# Patient Record
Sex: FEMALE | Race: BLACK OR AFRICAN AMERICAN | NOT HISPANIC OR LATINO | Employment: FULL TIME | URBAN - METROPOLITAN AREA
[De-identification: names, ages, dates, MRNs, and addresses within clinical notes are randomized per-mention and may not be internally consistent; named-entity substitution may affect disease eponyms.]

---

## 2018-08-14 ENCOUNTER — OFFICE VISIT (OUTPATIENT)
Dept: UROGYNECOLOGY | Facility: CLINIC | Age: 23
End: 2018-08-14
Payer: COMMERCIAL

## 2018-08-14 VITALS
WEIGHT: 127 LBS | BODY MASS INDEX: 21.68 KG/M2 | SYSTOLIC BLOOD PRESSURE: 110 MMHG | DIASTOLIC BLOOD PRESSURE: 70 MMHG | HEIGHT: 64 IN

## 2018-08-14 DIAGNOSIS — Z12.4 ENCOUNTER FOR SCREENING FOR CERVICAL CANCER: ICD-10-CM

## 2018-08-14 DIAGNOSIS — Z01.419 WELL WOMAN EXAM: Primary | ICD-10-CM

## 2018-08-14 DIAGNOSIS — N89.8 VAGINAL DISCHARGE: ICD-10-CM

## 2018-08-14 PROCEDURE — 87491 CHLMYD TRACH DNA AMP PROBE: CPT

## 2018-08-14 PROCEDURE — 99999 PR PBB SHADOW E&M-NEW PATIENT-LVL III: CPT | Mod: PBBFAC,,, | Performed by: NURSE PRACTITIONER

## 2018-08-14 PROCEDURE — 99385 PREV VISIT NEW AGE 18-39: CPT | Mod: S$GLB,,, | Performed by: NURSE PRACTITIONER

## 2018-08-14 PROCEDURE — 87660 TRICHOMONAS VAGIN DIR PROBE: CPT

## 2018-08-14 PROCEDURE — 88175 CYTOPATH C/V AUTO FLUID REDO: CPT

## 2018-08-14 NOTE — PATIENT INSTRUCTIONS
1. Well woman  --pap today  --takes 2-3 weeks for results    2. Contraception  --desires mirena  --will send benefits approval  --plan to insert when on menses  --if you have another menses prior to approval, will plan on ortho evra x one month    3. Vaginal discharge  --affirm/ genprobe today    4. RTC for iud insertion

## 2018-08-14 NOTE — PROGRESS NOTES
"08/14/2018    SUBJECTIVE:   23 y.o. female for annual exam.  Was on depoprovera since age 20-- last depo was due in 04/2018-- not using contraception.    History reviewed. No pertinent past medical history.    History reviewed. No pertinent surgical history.    No current outpatient medications on file.     No current facility-administered medications for this visit.      Allergies: Patient has no known allergies.   Patient's last menstrual period was 07/29/2018.     Menses q 28- 30 days-- has had 3 cycles since stopping depoprovera       Well Woman:  Pap:n/a        Family History  Family History   Problem Relation Age of Onset    Vaginal cancer Neg Hx     Endometrial cancer Neg Hx     Cervical cancer Neg Hx           ROS:  Feeling well.   No dyspnea or chest pain on exertion.    No abdominal pain, change in bowel habits, black or bloody stools.    No urinary tract symptoms.   GYN ROS: normal menses, no abnormal bleeding, pelvic pain or discharge, no breast pain or new or enlarging lumps on self exam.   No neurological complaints.    OBJECTIVE:   The patient appears well, alert, oriented x 3, in no distress.  /70 (BP Location: Right arm, Patient Position: Sitting, BP Method: Medium (Manual))   Ht 5' 4" (1.626 m)   Wt 57.6 kg (126 lb 15.8 oz)   LMP 07/29/2018   BMI 21.80 kg/m²   ENT normal.  Neck supple. No adenopathy or thyromegaly. NICOLE.   Lungs are clear, good air entry, no wheezes, rhonchi or rales.   S1 and S2 normal, no murmurs, regular rate and rhythm.   Abdomen soft without tenderness, guarding, mass or organomegaly.   Extremities show no edema, normal peripheral pulses.   Neurological is normal, no focal findings.    BREAST EXAM: breasts appear normal, no suspicious masses, no skin or nipple changes or axillary nodes    PELVIC EXAM:   VULVA: normal appearing vulva with no masses, tenderness or lesions,   VAGINA: normal appearing vagina with normal color and discharge, no lesions,  CERVIX: " normal appearing cervix without discharge or lesions,   UTERUS: uterus is normal size, shape, consistency and nontender,   ADNEXA: no masses,     ASSESSMENT:   1. Well woman exam     2. Vaginal discharge  Vaginosis Screen by DNA Probe    C. trachomatis/N. gonorrhoeae by AMP DNA [facility]; Cervicovaginal   3. Encounter for screening for cervical cancer   Liquid-based pap smear, screening       PLAN:   1. Well woman  --pap today  --takes 2-3 weeks for results    2. Contraception  --desires mirena  --will send benefits approval  --plan to insert when on menses  --if you have another menses prior to approval, will plan on ortho evra x one month    3. Vaginal discharge  --affirm/ genprobe today    4. RTC for iud insertion        30 minutes were spent in face to face time with this patient  90 % of this time was spent in counseling and/or coordination of care  Rosa NASH [facility]  Division of Female Pelvic Medicine and Reconstructive Surgery  Department of Obstetrics & Gynecology

## 2018-08-15 LAB
C TRACH DNA SPEC QL NAA+PROBE: NOT DETECTED
CANDIDA RRNA VAG QL PROBE: NEGATIVE
G VAGINALIS RRNA GENITAL QL PROBE: NEGATIVE
N GONORRHOEA DNA SPEC QL NAA+PROBE: NOT DETECTED
T VAGINALIS RRNA GENITAL QL PROBE: NEGATIVE

## 2018-08-16 ENCOUNTER — TELEPHONE (OUTPATIENT)
Dept: UROGYNECOLOGY | Facility: CLINIC | Age: 23
End: 2018-08-16

## 2018-08-16 NOTE — TELEPHONE ENCOUNTER
----- Message from Echo Bae sent at 8/16/2018  4:47 PM CDT -----  Contact: self  Patient returned call; informed patient negative GC/CT results and Negative affirm results as per Rosa Benoit    Informed patient that her PAP was still in process, patient had no other questions

## 2018-08-16 NOTE — TELEPHONE ENCOUNTER
----- Message from Rosa Benoit NP sent at 8/16/2018  9:55 AM CDT -----  Please let patient know vaginal cultures were negative.  STEPAN Rashid-BC

## 2018-08-16 NOTE — TELEPHONE ENCOUNTER
Called pt no answer, Left voice message for pt to give the office a call back at [phone] regarding test results.

## 2018-08-21 ENCOUNTER — TELEPHONE (OUTPATIENT)
Dept: UROGYNECOLOGY | Facility: CLINIC | Age: 23
End: 2018-08-21

## 2018-08-21 NOTE — TELEPHONE ENCOUNTER
Returned pt call no answer, Left voice message for pt to give the office a call back at [phone].

## 2018-08-21 NOTE — TELEPHONE ENCOUNTER
----- Message from Miriam Morales sent at 8/21/2018  1:03 PM CDT -----  Contact: RUPERT ORLANDO [[id number]]            Name of Who is Calling: RUPERT ORLANDO [[id number]]    What is the request in detail: patient states she was told by NP to call the day she start her cycle for IUD insertion. Please call     Can the clinic reply by [facility]:no      [facility]: [phone]

## 2018-08-22 ENCOUNTER — TELEPHONE (OUTPATIENT)
Dept: UROGYNECOLOGY | Facility: CLINIC | Age: 23
End: 2018-08-22

## 2018-08-22 NOTE — TELEPHONE ENCOUNTER
----- Message from Neto Reynolds sent at 8/22/2018  8:38 AM CDT -----  PLEASE CALL PT SHE WAS TOLD TO CALL BACK WHEN HER CYCLE IS DOWN [phone]

## 2018-08-23 ENCOUNTER — PROCEDURE VISIT (OUTPATIENT)
Dept: UROGYNECOLOGY | Facility: CLINIC | Age: 23
End: 2018-08-23
Payer: COMMERCIAL

## 2018-08-23 VITALS
HEIGHT: 64 IN | SYSTOLIC BLOOD PRESSURE: 90 MMHG | BODY MASS INDEX: 21.38 KG/M2 | DIASTOLIC BLOOD PRESSURE: 60 MMHG | WEIGHT: 125.25 LBS

## 2018-08-23 DIAGNOSIS — Z30.430 ENCOUNTER FOR IUD INSERTION: Primary | ICD-10-CM

## 2018-08-23 PROCEDURE — 58300 INSERT INTRAUTERINE DEVICE: CPT | Mod: S$GLB,,, | Performed by: NURSE PRACTITIONER

## 2018-08-23 NOTE — PROCEDURES
Procedures     PROCEDURE:  Placement of IUD (Mirena)    The patient was consented for the procedure.  All risks/benefits/alternatives were reviewed (see consent). The patient was placed in dorsal lithotomy position.  A sterile speculum was used to identify the cervix. The ectocervix was prepped x 2 with betadine.  A single toothed was used to grasp the posterior cervix.  A uterine sound was advanced into the uterine cavity until gentle resistance was met, at approximately 5.5 cm.  The IUD device was loaded back into the cannula, and the guard was set to 5.5 cm. The cannula was inserted into the cervix until the guard was flush with the external os.  The IUD was then deployed using standard technique, and the cannula was removed.  The single toothed was removed from the cervix, and excellent hemostasis was noted.  The strings were trimmed to about 3 cm.  The patient tolerated the procedure well. After cleaning the vault with ob swabs, the tip of the IUD was noted deep in the cervical os.  Patient was given expulsion precautions.  If IUD does not migrate appropriately, will consider kyleena.  Rosa Benoit, STEPAN-BC

## 2018-09-05 ENCOUNTER — OFFICE VISIT (OUTPATIENT)
Dept: UROGYNECOLOGY | Facility: CLINIC | Age: 23
End: 2018-09-05
Payer: COMMERCIAL

## 2018-09-05 VITALS
WEIGHT: 124.56 LBS | SYSTOLIC BLOOD PRESSURE: 100 MMHG | DIASTOLIC BLOOD PRESSURE: 60 MMHG | BODY MASS INDEX: 21.27 KG/M2 | HEIGHT: 64 IN

## 2018-09-05 DIAGNOSIS — T83.32XA INTRAUTERINE DEVICE (IUD) MIGRATION, INITIAL ENCOUNTER: Primary | ICD-10-CM

## 2018-09-05 PROCEDURE — 99213 OFFICE O/P EST LOW 20 MIN: CPT | Mod: S$GLB,,, | Performed by: NURSE PRACTITIONER

## 2018-09-05 PROCEDURE — 3008F BODY MASS INDEX DOCD: CPT | Mod: CPTII,S$GLB,, | Performed by: NURSE PRACTITIONER

## 2018-09-05 PROCEDURE — 99999 PR PBB SHADOW E&M-EST. PATIENT-LVL III: CPT | Mod: PBBFAC,,, | Performed by: NURSE PRACTITIONER

## 2018-09-05 NOTE — PROGRESS NOTES
"09/05/2018    SUBJECTIVE:   23 y.o. female for IUD check    History reviewed. No pertinent past medical history.    History reviewed. No pertinent surgical history.    Current Outpatient Medications   Medication Sig Dispense Refill    levonorgestrel (MIRENA) 20 mcg/24 hr (5 years) IUD 1 each by Intrauterine route once.       No current facility-administered medications for this visit.      Allergies: Patient has no known allergies.   Patient's last menstrual period was 08/20/2018.      Well Woman:  Pap:08/2018 normal        Family History  Family History   Problem Relation Age of Onset    Vaginal cancer Neg Hx     Endometrial cancer Neg Hx     Cervical cancer Neg Hx         ROS:  Feeling well.   No dyspnea or chest pain on exertion.    No abdominal pain, change in bowel habits, black or bloody stools.    No urinary tract symptoms.   GYN ROS: normal menses, no abnormal bleeding, pelvic pain or discharge, no breast pain or new or enlarging lumps on self exam. No neurological complaints.    OBJECTIVE:   The patient appears well, alert, oriented x 3, in no distress.  /60 (BP Location: Right arm, Patient Position: Sitting, BP Method: Medium (Manual))   Ht 5' 4" (1.626 m)   Wt 56.5 kg (124 lb 9 oz)   LMP 08/20/2018   BMI 21.38 kg/m²   Abdomen soft without tenderness, guarding, mass or organomegaly.   Extremities show no edema, normal peripheral pulses.   Neurological is normal, no focal findings.      PELVIC EXAM:   VULVA: normal appearing vulva with no masses, tenderness or lesions,   VAGINA: normal appearing vagina with normal color and discharge, no lesions,  CERVIX: normal appearing cervix without discharge or lesions, tip of iud visible at cervical os,   UTERUS: uterus is normal size, shape, consistency and nontender,   ADNEXA: no masses,     ASSESSMENT:   No diagnosis found.    PLAN:   1. Pelvic ultrasound to evaluate iud placement  --if malpositioned, consider kyleena      25 minutes were spent in " face to face time with this patient  90 % of this time was spent in counseling and/or coordination of care  Rosa NASH [facility]  Division of Female Pelvic Medicine and Reconstructive Surgery  Department of Obstetrics & Gynecology

## 2018-09-06 ENCOUNTER — HOSPITAL ENCOUNTER (OUTPATIENT)
Dept: RADIOLOGY | Facility: OTHER | Age: 23
Discharge: HOME | End: 2018-09-06
Attending: NURSE PRACTITIONER
Payer: COMMERCIAL

## 2018-09-06 DIAGNOSIS — T83.32XA INTRAUTERINE DEVICE (IUD) MIGRATION, INITIAL ENCOUNTER: ICD-10-CM

## 2018-09-06 PROCEDURE — 76830 TRANSVAGINAL US NON-OB: CPT | Mod: TC

## 2018-09-06 PROCEDURE — 76856 US EXAM PELVIC COMPLETE: CPT | Mod: 26,,, | Performed by: RADIOLOGY

## 2018-09-06 PROCEDURE — 76830 TRANSVAGINAL US NON-OB: CPT | Mod: 26,,, | Performed by: RADIOLOGY

## 2018-09-17 ENCOUNTER — TELEPHONE (OUTPATIENT)
Dept: UROGYNECOLOGY | Facility: CLINIC | Age: 23
End: 2018-09-17

## 2018-09-17 NOTE — TELEPHONE ENCOUNTER
Spoke with [employer] [phone] and completed a benefit investigation request form for mima Kyleena #[id number]. They will notifiy the office once a decision is made by the end of the week.

## 2018-09-21 ENCOUNTER — TELEPHONE (OUTPATIENT)
Dept: UROGYNECOLOGY | Facility: CLINIC | Age: 23
End: 2018-09-21

## 2018-09-21 NOTE — TELEPHONE ENCOUNTER
Spoke with [employer] [phone] regarding pt's case #[id number], Letter will be faxed out today and we should receive it by next week.

## 2018-09-26 ENCOUNTER — TELEPHONE (OUTPATIENT)
Dept: UROGYNECOLOGY | Facility: CLINIC | Age: 23
End: 2018-09-26

## 2018-09-26 DIAGNOSIS — Z30.430 ENCOUNTER FOR IUD INSERTION: Primary | ICD-10-CM

## 2018-10-19 ENCOUNTER — TELEPHONE (OUTPATIENT)
Dept: UROGYNECOLOGY | Facility: CLINIC | Age: 23
End: 2018-10-19

## 2018-10-19 NOTE — TELEPHONE ENCOUNTER
----- Message from Neto Reynolds sent at 10/19/2018  4:12 PM CDT -----  Pt returning your call [phone]

## 2018-10-23 ENCOUNTER — TELEPHONE (OUTPATIENT)
Dept: UROGYNECOLOGY | Facility: CLINIC | Age: 23
End: 2018-10-23

## 2018-10-23 NOTE — TELEPHONE ENCOUNTER
Called pt no answer, Left voice message for pt to give the office a call back at [phone] to schedule an apointment for IUD INSERTION.

## 2018-10-23 NOTE — TELEPHONE ENCOUNTER
----- Message from Karen Child sent at 10/23/2018  2:26 PM CDT -----  Contact: pt   Name of Who is Calling: RUPERT ORLANDO [[id number]]       What is the request in detail: patient is returning a call...Please contact to further discuss and advise.       Can the clinic reply by [facility]: no       What Number to Call Back if not in [facility]: [phone]

## 2018-10-23 NOTE — TELEPHONE ENCOUNTER
Returned pt call no answer, Left voice message for pt to give the office a call back at [phone] to schedule an IUD insertion appointment during her menses.

## 2018-10-23 NOTE — TELEPHONE ENCOUNTER
----- Message from Rosa Benoit NP sent at 10/19/2018  2:21 PM CDT -----  Please call patient to schedule insertion/ removal of iud-- we will place a celio Lee

## 2018-10-23 NOTE — TELEPHONE ENCOUNTER
Called pt no answer, Left voice message for pt to give the office a call back at [phone] to schedule an apt for IUD insertion.

## 2018-10-31 ENCOUNTER — TELEPHONE (OUTPATIENT)
Dept: UROGYNECOLOGY | Facility: CLINIC | Age: 23
End: 2018-10-31

## 2018-10-31 ENCOUNTER — PROCEDURE VISIT (OUTPATIENT)
Dept: UROGYNECOLOGY | Facility: CLINIC | Age: 23
End: 2018-10-31
Payer: COMMERCIAL

## 2018-10-31 VITALS
WEIGHT: 128.5 LBS | DIASTOLIC BLOOD PRESSURE: 70 MMHG | HEIGHT: 64 IN | SYSTOLIC BLOOD PRESSURE: 120 MMHG | BODY MASS INDEX: 21.94 KG/M2

## 2018-10-31 DIAGNOSIS — Z30.430 ENCOUNTER FOR IUD INSERTION: Primary | ICD-10-CM

## 2018-10-31 PROCEDURE — 58300 INSERT INTRAUTERINE DEVICE: CPT | Mod: 51,S$GLB,, | Performed by: NURSE PRACTITIONER

## 2018-10-31 PROCEDURE — 58301 REMOVE INTRAUTERINE DEVICE: CPT | Mod: S$GLB,,, | Performed by: NURSE PRACTITIONER

## 2018-10-31 NOTE — PROCEDURES
Procedures   PROCEDURE: Removal of IUD    Strings grasped with ring forcepts.  Iud removed; device intact.     Placement of IUD (Mirena)    The patient was consented for the procedure.  All risks/benefits/alternatives were reviewed (see consent). The patient was placed in dorsal lithotomy position.  A sterile speculum was used to identify the cervix. The ectocervix was prepped x 2 with betadine.  A single toothed tenaculum was used to grasp the anterior cervix.  A uterine sound was advanced into the uterine cavity until gentle resistance was met, at approximately 5 cm.  The IUD device was loaded back into the cannula, and the guard was set to 5 cm.The cannula was inserted into the cervix until the guard was flush with the external os.  The IUD was then deployed using standard technique, and the cannula was removed.  The single toothed tenaculum was removed from the cervix, and excellent hemostasis was noted.  The strings were trimmed to about 3 cm.  The patient tolerated the procedure well. STEPAN Rashid-BC

## 2018-10-31 NOTE — TELEPHONE ENCOUNTER
Spoke with pt and scheduled her IUD appointment for today at 3 pm due to her starting her menses on Saturday. Per NP Kaycee

## 2018-12-28 ENCOUNTER — TELEPHONE (OUTPATIENT)
Dept: UROGYNECOLOGY | Facility: CLINIC | Age: 23
End: 2018-12-28

## 2018-12-28 NOTE — TELEPHONE ENCOUNTER
Spoke to pt, IUD check appointment scheduled 1/10 at the [facility] location, pt aware and verbalizes understanding

## 2018-12-28 NOTE — TELEPHONE ENCOUNTER
----- Message from Rosa Benoit NP sent at 12/20/2018  1:42 PM CST -----  Please schedule patient for iud check up

## 2019-01-10 ENCOUNTER — OFFICE VISIT (OUTPATIENT)
Dept: UROGYNECOLOGY | Facility: CLINIC | Age: 24
End: 2019-01-10
Payer: COMMERCIAL

## 2019-01-10 VITALS
SYSTOLIC BLOOD PRESSURE: 100 MMHG | DIASTOLIC BLOOD PRESSURE: 70 MMHG | BODY MASS INDEX: 21.15 KG/M2 | WEIGHT: 123.88 LBS | HEIGHT: 64 IN

## 2019-01-10 DIAGNOSIS — Z30.431 IUD CHECK UP: Primary | ICD-10-CM

## 2019-01-10 PROCEDURE — 3008F PR BODY MASS INDEX (BMI) DOCUMENTED: ICD-10-PCS | Mod: CPTII,S$GLB,, | Performed by: NURSE PRACTITIONER

## 2019-01-10 PROCEDURE — 99213 OFFICE O/P EST LOW 20 MIN: CPT | Mod: S$GLB,,, | Performed by: NURSE PRACTITIONER

## 2019-01-10 PROCEDURE — 99213 PR OFFICE/OUTPT VISIT, EST, LEVL III, 20-29 MIN: ICD-10-PCS | Mod: S$GLB,,, | Performed by: NURSE PRACTITIONER

## 2019-01-10 PROCEDURE — 99999 PR PBB SHADOW E&M-EST. PATIENT-LVL III: ICD-10-PCS | Mod: PBBFAC,,, | Performed by: NURSE PRACTITIONER

## 2019-01-10 PROCEDURE — 99999 PR PBB SHADOW E&M-EST. PATIENT-LVL III: CPT | Mod: PBBFAC,,, | Performed by: NURSE PRACTITIONER

## 2019-01-10 PROCEDURE — 3008F BODY MASS INDEX DOCD: CPT | Mod: CPTII,S$GLB,, | Performed by: NURSE PRACTITIONER

## 2019-01-10 RX ORDER — ONDANSETRON 8 MG/1
TABLET, ORALLY DISINTEGRATING ORAL
Refills: 0 | COMMUNITY
Start: 2018-12-28 | End: 2019-01-10

## 2019-01-10 RX ORDER — AZITHROMYCIN 250 MG/1
TABLET, FILM COATED ORAL
Refills: 0 | COMMUNITY
Start: 2018-12-28 | End: 2019-01-10

## 2019-01-11 NOTE — PROGRESS NOTES
"01/10/2019    SUBJECTIVE:   23 y.o. female for IUD check    History reviewed. No pertinent past medical history.    History reviewed. No pertinent surgical history.    Current Outpatient Medications   Medication Sig Dispense Refill    levonorgestrel (MIRENA) 20 mcg/24 hr (5 years) IUD 1 each by Intrauterine route once.       No current facility-administered medications for this visit.      Allergies: Patient has no known allergies.   Patient's last menstrual period was 12/19/2018.      Well Woman:  Pap:08/2018 normal        Family History  Family History   Problem Relation Age of Onset    Vaginal cancer Neg Hx     Endometrial cancer Neg Hx     Cervical cancer Neg Hx         ROS:  Feeling well.   No dyspnea or chest pain on exertion.    No abdominal pain, change in bowel habits, black or bloody stools.    No urinary tract symptoms.   GYN ROS: normal menses, no abnormal bleeding, pelvic pain or discharge, no breast pain or new or enlarging lumps on self exam. No neurological complaints.    OBJECTIVE:   The patient appears well, alert, oriented x 3, in no distress.  /70 (BP Location: Right arm, Patient Position: Sitting, BP Method: Medium (Manual))   Ht 5' 4" (1.626 m)   Wt 56.2 kg (123 lb 14.4 oz)   LMP 12/19/2018   BMI 21.27 kg/m²   Abdomen soft without tenderness, guarding, mass or organomegaly.   Extremities show no edema, normal peripheral pulses.   Neurological is normal, no focal findings.      PELVIC EXAM:   VULVA: normal appearing vulva with no masses, tenderness or lesions,   VAGINA: normal appearing vagina with normal color and discharge, no lesions,  CERVIX: normal appearing cervix without discharge or lesions, Iud strings visible-- iud tip is not visible,   UTERUS: uterus is normal size, shape, consistency and nontender,   ADNEXA: no masses,     ASSESSMENT:   1. IUD check up         PLAN:   1. iud in place    2. RTC for annual      25 minutes were spent in face to face time with this " patient  90 % of this time was spent in counseling and/or coordination of care  Rosa NASH [facility]  Division of Female Pelvic Medicine and Reconstructive Surgery  Department of Obstetrics & Gynecology

## 2019-03-10 ENCOUNTER — HOSPITAL ENCOUNTER (EMERGENCY)
Facility: OTHER | Age: 24
Discharge: HOME | End: 2019-03-11
Attending: EMERGENCY MEDICINE
Payer: COMMERCIAL

## 2019-03-10 DIAGNOSIS — R05.9 COUGH: Primary | ICD-10-CM

## 2019-03-10 DIAGNOSIS — J18.9 PNEUMONIA DUE TO INFECTIOUS ORGANISM, UNSPECIFIED LATERALITY, UNSPECIFIED PART OF LUNG: ICD-10-CM

## 2019-03-10 DIAGNOSIS — R11.2 NON-INTRACTABLE VOMITING WITH NAUSEA, UNSPECIFIED VOMITING TYPE: ICD-10-CM

## 2019-03-10 LAB
ALBUMIN SERPL BCP-MCNC: 4.2 G/DL
ALP SERPL-CCNC: 59 U/L
ALT SERPL W/O P-5'-P-CCNC: 16 U/L
ANION GAP SERPL CALC-SCNC: 12 MMOL/L
AST SERPL-CCNC: 23 U/L
B-HCG UR QL: NEGATIVE
BACTERIA #/AREA URNS HPF: NORMAL /HPF
BASOPHILS # BLD AUTO: 0.01 K/UL
BASOPHILS NFR BLD: 0.2 %
BILIRUB SERPL-MCNC: 0.7 MG/DL
BILIRUB UR QL STRIP: NEGATIVE
BUN SERPL-MCNC: 13 MG/DL
CALCIUM SERPL-MCNC: 10 MG/DL
CHLORIDE SERPL-SCNC: 102 MMOL/L
CLARITY UR: CLEAR
CO2 SERPL-SCNC: 25 MMOL/L
COLOR UR: YELLOW
CREAT SERPL-MCNC: 0.9 MG/DL
CTP QC/QA: YES
DIFFERENTIAL METHOD: ABNORMAL
EOSINOPHIL # BLD AUTO: 0 K/UL
EOSINOPHIL NFR BLD: 0 %
ERYTHROCYTE [DISTWIDTH] IN BLOOD BY AUTOMATED COUNT: 13.8 %
EST. GFR  (AFRICAN AMERICAN): >60 ML/MIN/1.73 M^2
EST. GFR  (NON AFRICAN AMERICAN): >60 ML/MIN/1.73 M^2
GLUCOSE SERPL-MCNC: 90 MG/DL
GLUCOSE UR QL STRIP: NEGATIVE
HCT VFR BLD AUTO: 45.4 %
HGB BLD-MCNC: 15.1 G/DL
HGB UR QL STRIP: ABNORMAL
HYALINE CASTS #/AREA URNS LPF: 0 /LPF
KETONES UR QL STRIP: NEGATIVE
LEUKOCYTE ESTERASE UR QL STRIP: NEGATIVE
LIPASE SERPL-CCNC: 9 U/L
LYMPHOCYTES # BLD AUTO: 0.8 K/UL
LYMPHOCYTES NFR BLD: 12.8 %
MCH RBC QN AUTO: 31.7 PG
MCHC RBC AUTO-ENTMCNC: 33.3 G/DL
MCV RBC AUTO: 95 FL
MICROSCOPIC COMMENT: NORMAL
MONOCYTES # BLD AUTO: 0.9 K/UL
MONOCYTES NFR BLD: 14.6 %
NEUTROPHILS # BLD AUTO: 4.7 K/UL
NEUTROPHILS NFR BLD: 72.4 %
NITRITE UR QL STRIP: NEGATIVE
PH UR STRIP: 6 [PH] (ref 5–8)
PLATELET # BLD AUTO: 282 K/UL
PMV BLD AUTO: 11.2 FL
POTASSIUM SERPL-SCNC: 3.8 MMOL/L
PROT SERPL-MCNC: 8.5 G/DL
PROT UR QL STRIP: ABNORMAL
RBC # BLD AUTO: 4.76 M/UL
RBC #/AREA URNS HPF: 0 /HPF (ref 0–4)
SODIUM SERPL-SCNC: 139 MMOL/L
SP GR UR STRIP: 1.02 (ref 1–1.03)
SQUAMOUS #/AREA URNS HPF: 9 /HPF
URN SPEC COLLECT METH UR: ABNORMAL
UROBILINOGEN UR STRIP-ACNC: 1 EU/DL
WBC # BLD AUTO: 6.43 K/UL
WBC #/AREA URNS HPF: 4 /HPF (ref 0–5)

## 2019-03-10 PROCEDURE — 96365 THER/PROPH/DIAG IV INF INIT: CPT | Mod: 59

## 2019-03-10 PROCEDURE — 81025 URINE PREGNANCY TEST: CPT | Performed by: EMERGENCY MEDICINE

## 2019-03-10 PROCEDURE — 96375 TX/PRO/DX INJ NEW DRUG ADDON: CPT | Mod: 59

## 2019-03-10 PROCEDURE — 99285 EMERGENCY DEPT VISIT HI MDM: CPT | Mod: 25

## 2019-03-10 PROCEDURE — 63600175 PHARM REV CODE 636 W HCPCS: Performed by: EMERGENCY MEDICINE

## 2019-03-10 PROCEDURE — 80053 COMPREHEN METABOLIC PANEL: CPT

## 2019-03-10 PROCEDURE — 85025 COMPLETE CBC W/AUTO DIFF WBC: CPT

## 2019-03-10 PROCEDURE — 96368 THER/DIAG CONCURRENT INF: CPT

## 2019-03-10 PROCEDURE — 83690 ASSAY OF LIPASE: CPT

## 2019-03-10 PROCEDURE — 81000 URINALYSIS NONAUTO W/SCOPE: CPT

## 2019-03-10 PROCEDURE — 25000003 PHARM REV CODE 250: Performed by: EMERGENCY MEDICINE

## 2019-03-10 PROCEDURE — 96361 HYDRATE IV INFUSION ADD-ON: CPT

## 2019-03-10 PROCEDURE — S0028 INJECTION, FAMOTIDINE, 20 MG: HCPCS | Performed by: EMERGENCY MEDICINE

## 2019-03-10 RX ORDER — METOCLOPRAMIDE HYDROCHLORIDE 5 MG/ML
10 INJECTION INTRAMUSCULAR; INTRAVENOUS
Status: COMPLETED | OUTPATIENT
Start: 2019-03-10 | End: 2019-03-10

## 2019-03-10 RX ORDER — DIPHENHYDRAMINE HYDROCHLORIDE 50 MG/ML
25 INJECTION INTRAMUSCULAR; INTRAVENOUS
Status: COMPLETED | OUTPATIENT
Start: 2019-03-10 | End: 2019-03-10

## 2019-03-10 RX ORDER — ONDANSETRON 2 MG/ML
4 INJECTION INTRAMUSCULAR; INTRAVENOUS
Status: COMPLETED | OUTPATIENT
Start: 2019-03-10 | End: 2019-03-10

## 2019-03-10 RX ORDER — FAMOTIDINE 10 MG/ML
20 INJECTION INTRAVENOUS
Status: COMPLETED | OUTPATIENT
Start: 2019-03-10 | End: 2019-03-10

## 2019-03-10 RX ADMIN — DIPHENHYDRAMINE HYDROCHLORIDE 25 MG: 50 INJECTION, SOLUTION INTRAMUSCULAR; INTRAVENOUS at 10:03

## 2019-03-10 RX ADMIN — FAMOTIDINE 20 MG: 10 INJECTION, SOLUTION INTRAVENOUS at 07:03

## 2019-03-10 RX ADMIN — PROMETHAZINE HYDROCHLORIDE 12.5 MG: 25 INJECTION INTRAMUSCULAR; INTRAVENOUS at 07:03

## 2019-03-10 RX ADMIN — SODIUM CHLORIDE 1000 ML: 0.9 INJECTION, SOLUTION INTRAVENOUS at 10:03

## 2019-03-10 RX ADMIN — ONDANSETRON 4 MG: 2 INJECTION INTRAMUSCULAR; INTRAVENOUS at 08:03

## 2019-03-10 RX ADMIN — IOHEXOL 75 ML: 350 INJECTION, SOLUTION INTRAVENOUS at 11:03

## 2019-03-10 RX ADMIN — SODIUM CHLORIDE 1000 ML: 0.9 INJECTION, SOLUTION INTRAVENOUS at 07:03

## 2019-03-10 RX ADMIN — METOCLOPRAMIDE 10 MG: 5 INJECTION, SOLUTION INTRAMUSCULAR; INTRAVENOUS at 10:03

## 2019-03-11 VITALS
DIASTOLIC BLOOD PRESSURE: 75 MMHG | TEMPERATURE: 98 F | HEIGHT: 63 IN | RESPIRATION RATE: 20 BRPM | BODY MASS INDEX: 21.97 KG/M2 | HEART RATE: 74 BPM | OXYGEN SATURATION: 99 % | SYSTOLIC BLOOD PRESSURE: 124 MMHG | WEIGHT: 124 LBS

## 2019-03-11 PROCEDURE — 25500020 PHARM REV CODE 255: Performed by: EMERGENCY MEDICINE

## 2019-03-11 PROCEDURE — 63600175 PHARM REV CODE 636 W HCPCS: Performed by: EMERGENCY MEDICINE

## 2019-03-11 RX ORDER — METOCLOPRAMIDE 10 MG/1
10 TABLET ORAL EVERY 6 HOURS PRN
Qty: 15 TABLET | Refills: 0 | OUTPATIENT
Start: 2019-03-11 | End: 2020-05-18

## 2019-03-11 RX ORDER — AZITHROMYCIN 250 MG/1
250 TABLET, FILM COATED ORAL DAILY
Qty: 6 TABLET | Refills: 0 | Status: SHIPPED | OUTPATIENT
Start: 2019-03-11

## 2019-03-11 RX ADMIN — AZITHROMYCIN MONOHYDRATE 500 MG: 500 INJECTION, POWDER, LYOPHILIZED, FOR SOLUTION INTRAVENOUS at 01:03

## 2019-03-11 RX ADMIN — CEFTRIAXONE 1 G: 1 INJECTION, SOLUTION INTRAVENOUS at 12:03

## 2019-03-11 NOTE — ED NOTES
"Pt able to tolerate PO liquids. Pt states " I was able to drink without throwing up but I still feel a little bit nauseous"   "

## 2019-03-11 NOTE — ED PROVIDER NOTES
"Encounter Date: 3/10/2019    SCRIBE #1 NOTE: I, Enid Sweeney, am scribing for, and in the presence of, Dr. Rice.       History     Chief Complaint   Patient presents with    Abdominal Pain     + intermittent generalzied left sided pains described as " sharp". Pt reports seen at Urgent Care yesterday for similar symtpoms. Denies fever     Time seen by provider: 7:27 PM    This is a 24 y.o. female who presents with complaint of abdominal pain for the past two days. She reports onset of cough with one episode of emesis three days ago. She was not nauseated at the time and thought emesis was post tussive. Cough has been intermittent, and gradually became productive. Next day, pt reports abdominal pain, nausea, and three to four episodes of emesis. Pt vomited seven times yesterday and five today. She states that emesis is green/yellow. She reports being unable to tolerate any PO intake, including Zofran which provided no relief. She had two episodes of "runny" and "loose" diarrhea with mucus yesterday and once today. She denies any blood in stool or vomitus. She also c/o diaphoresis. Pt states that she was evaluated at urgent care and discharged s/p IVF and injection of decadron. Pt reports eating chicken three days ago, which may have been undercooked. She reports that other people ate the chicken without complication. She also states that her roommate had similar sx with gastritis two weeks ago. Pt has hx of similar sx once in the past, with additional fever and chills. Sx resolved after two days and pt states she was diagnosed with flu at that time. Pt admits to smoking marijuana, but denies tobacco use and other illicit drug use. She drinks occasionally and reports not drinking much during [place]. She denies any fever, chills, chest pain, SOB, back pain, HA, weakness, dizziness, lightheadedness, and urinary sx.      The history is provided by the patient.     Review of patient's allergies " indicates:  No Known Allergies  History reviewed. No pertinent past medical history.  History reviewed. No pertinent surgical history.  Family History   Problem Relation Age of Onset    Vaginal cancer Neg Hx     Endometrial cancer Neg Hx     Cervical cancer Neg Hx      Social History     Tobacco Use    Smoking status: Never Smoker    Smokeless tobacco: Never Used   Substance Use Topics    Alcohol use: No     Frequency: Never    Drug use: No     Review of Systems   Constitutional: Positive for diaphoresis. Negative for chills and fever.   HENT: Negative for congestion, rhinorrhea and sore throat.    Respiratory: Negative for cough and shortness of breath.    Cardiovascular: Negative for chest pain.   Gastrointestinal: Positive for abdominal pain, diarrhea, nausea and vomiting. Negative for blood in stool and constipation.        Negative for blood in vomitus.   Endocrine: Negative for polyuria.   Genitourinary: Negative for decreased urine volume, difficulty urinating, dysuria, frequency and urgency.   Musculoskeletal: Negative for back pain.   Skin: Negative for rash.   Allergic/Immunologic: Negative for immunocompromised state.   Neurological: Negative for dizziness, weakness, light-headedness and headaches.   Hematological: Does not bruise/bleed easily.   Psychiatric/Behavioral: Negative for confusion.       Physical Exam     Initial Vitals [03/10/19 1822]   BP Pulse Resp Temp SpO2   114/77 87 16 98.2 °F (36.8 °C) 99 %      MAP       --         Physical Exam    Nursing note and vitals reviewed.  Constitutional: She appears well-developed and well-nourished. No distress.   HENT:   Head: Normocephalic and atraumatic.   Right Ear: External ear normal.   Left Ear: External ear normal.   Dry mucous membranes.   Eyes: Right eye exhibits no discharge. Left eye exhibits no discharge.   Neck: Normal range of motion. Neck supple.   Cardiovascular: Normal rate, regular rhythm and normal heart sounds.    Pulmonary/Chest: Breath sounds normal. No respiratory distress.   Abdominal: Soft. Bowel sounds are normal. She exhibits no distension. There is tenderness in the left upper quadrant. There is no rebound and no guarding.   Musculoskeletal: Normal range of motion.   Lymphadenopathy:     She has no cervical adenopathy.   Neurological: She is alert and oriented to person, place, and time. She has normal strength. No cranial nerve deficit or sensory deficit.   Skin: Skin is warm and dry. No rash noted. No erythema.   Psychiatric: She has a normal mood and affect. Her behavior is normal.         ED Course   Procedures  Labs Reviewed   URINALYSIS, REFLEX TO URINE CULTURE - Abnormal; Notable for the following components:       Result Value    Protein, UA 1+ (*)     Occult Blood UA 3+ (*)     All other components within normal limits    Narrative:     Preferred Collection Type->Urine, Clean Catch   CBC W/ AUTO DIFFERENTIAL - Abnormal; Notable for the following components:    MCH 31.7 (*)     Lymph # 0.8 (*)     Lymph% 12.8 (*)     All other components within normal limits   COMPREHENSIVE METABOLIC PANEL - Abnormal; Notable for the following components:    Total Protein 8.5 (*)     All other components within normal limits   LIPASE   URINALYSIS MICROSCOPIC    Narrative:     Preferred Collection Type->Urine, Clean Catch   POCT URINE PREGNANCY           Imaging Results          X-Ray Chest PA And Lateral (Final result)  Result time 03/11/19 00:32:56    Final result by Ngozi Cortes MD (03/11/19 00:32:56)                 Impression:      Unremarkable 2 views of the chest.      Electronically signed by: Ngozi Cortes  Date:    03/11/2019  Time:    00:32             Narrative:    EXAMINATION:  TWO VIEWS OF THE CHEST    CLINICAL HISTORY:  Cough    TECHNIQUE:  Two views of the chest.    COMPARISON:  None.    FINDINGS:  The cardiac silhouette is within normal limits.   There is no focal consolidation, pneumothorax, or  pleural effusion.                               CT Abdomen Pelvis With Contrast (Final result)  Result time 03/10/19 23:38:59    Final result by Ngozi Cortes MD (03/10/19 23:38:59)                 Impression:      Small bilateral lower lobe patchy infiltrates.    Punctate left renal calculi.    Suboptimal positioning of an IUD.    Small free pelvic fluid.      Electronically signed by: Ngozi Cortes  Date:    03/10/2019  Time:    23:38             Narrative:    EXAMINATION:  CT OF ABDOMEN PELVIS WITH    CLINICAL HISTORY:  LUQ/LLQ pain;    TECHNIQUE:  5 mm enhanced axial images were obtained from the lung bases through the greater trochanters.   mL of Omnipaque 350 was injected.    COMPARISON:  09/06/2018    FINDINGS:  The liver, spleen, pancreas, right kidney, and adrenal glands are unremarkable. The gallbladder contains no calcified gallstones.  Small focal fat is seen adjacent to the falciform ligament..    There are at least 2 punctate left renal calculi (axial images 69 and 79)..    There is no definite evidence for abdominal adenopathy or ascites.  A belly ring is present.    An IUD is seen at the lower aspect of the endometrial cavity and lower uterine segment in the retroverted uterus.  A similar finding was reported on ultrasound from 09/06/2018.  There are no pelvic masses or adenopathy.    There is small free pelvic fluid in the pelvis.    There is small patchy infiltrates seen in bilateral lower lobes, left greater than right.                                   Medical Decision Making:   Initial Assessment:       Previously healthy 24-year-old female presents with persistent vomiting and left upper quadrant pain for the past 3 days.  She initially had a productive cough the the day before onset with 1 episode of posttussive emesis, but the next day started to have left upper quadrant pain with intractable vomiting that has not improved despite starting Zofran yesterday and urgent care visit where  she was treated with fluids.  She also has some loose stools and chills. Cough is somewhat improved since onset, with no fevers or congestion associated.  No known sick contacts or suspected food poisoning; she thought a chicken she ate prior to onset may have been undercooked, but other people ate the same thing without any symptoms.   On exam she has mild dehydration and left upper quadrant tenderness, but no acute abdomen or fever.  Concern for gastritis, viral syndrome, LAURA/electrolyte abnormality.      Basic labs with normal WBC and CMP/lipase.  Patient initially had improvement with Pepcid/Phenergan/IVF, but about hour later pain returned with vomiting. Still vomiting after Zofran as well. Given persistent pain, CT abdomen checked with no acute intra-abdominal findings but did show small bilateral opacity infiltrates.  Chest x-ray with no acute findings in upper lobes.  Patient now tolerating p.o. after Reglan/Benadryl.  Could be viral pneumonia, but will treat empirically for CAP with ceftriaxone and Zithromax.  Patient with no hypoxia, SOB, or other indication for inpatient treatment.  Still no recurrent emesis after observed for another few hours, and she feels comfortable with trial of outpatient management with Z-Jose to cover pneumonia and Reglan p.r.n. for nausea.  She will continue p.o. hydration and supportive care, and Pepcid for likely gastritis accounting for left upper quadrant pain. She will return to the ED for any recurrent emesis or difficulty tolerating antibiotics, or any new symptoms such as SOB or fever.          Clinical Tests:   Lab Tests: Ordered and Reviewed  Radiological Study: Ordered and Reviewed            Scribe Attestation:   Scribe #1: I performed the above scribed service and the documentation accurately describes the services I performed. I attest to the accuracy of the note.    Attending Attestation:           Physician Attestation for Scribe:  Physician Attestation Statement  for Scribe #1: I, Dr. Rice, reviewed documentation, as scribed by Enid Sweeney in my presence, and it is both accurate and complete.                    Clinical Impression:     1. Cough    2. Non-intractable vomiting with nausea, unspecified vomiting type    3. Pneumonia due to infectious organism, unspecified laterality, unspecified part of lung                                 Leonard Rice MD  03/12/19 1074

## 2019-03-11 NOTE — ED TRIAGE NOTES
Pt arrived to ED with c/o constant LLQ pain x3 days. Pt reports multiple episodes of vomiting and sweats but denies fever, dysuria, back pain, fever, sob, cp, hematuria.

## 2019-10-17 ENCOUNTER — TELEPHONE (OUTPATIENT)
Dept: UROGYNECOLOGY | Facility: CLINIC | Age: 24
End: 2019-10-17

## 2019-10-17 NOTE — TELEPHONE ENCOUNTER
----- Message from Natalia Parham sent at 10/17/2019  3:19 PM CDT -----  Contact: RUPERT ORLANDO [[id number]]  Type:  Patient Returning Call    Who Called: RUPERT ORLANDO [[id number]]    Who Left Message for Patient: Anushka    Does the patient know what this is regarding?: yes    Best Call Back Number: [phone]    Additional Information: No color discharge, just fishy smell for the past 2 days. She was wondering if she can get a Rx for BV or if she needs to be seen before getting Rx. If no answer, please leave detailed message with the answer to question.

## 2019-10-17 NOTE — TELEPHONE ENCOUNTER
----- Message from Evy Jeffers sent at 10/17/2019  1:07 PM CDT -----  Contact: RUPERT ORLANDO [[id number]]  Name of Who is Calling: RUPERT ORLANDO [[id number]]      What is the request in detail: Pt is calling in regards to a medication for an vaginal odor .Please call to further discuss.      Can the clinic reply by [facility]: N       What Number to Call Back if not in [facility]: [phone]

## 2019-10-17 NOTE — TELEPHONE ENCOUNTER
Returned pt call no answer, Left voice message for pt to give the office a call back at [phone].

## 2019-10-17 NOTE — TELEPHONE ENCOUNTER
Informed pt per NP Kaycee she needed to schedule an appt. Pt voiced understanding and stated she needed to call back to schedule due to her being at work. Voiced understanding and call ended.

## 2020-01-16 ENCOUNTER — OFFICE VISIT (OUTPATIENT)
Dept: OBSTETRICS AND GYNECOLOGY | Facility: CLINIC | Age: 25
End: 2020-01-16
Payer: COMMERCIAL

## 2020-01-16 VITALS
BODY MASS INDEX: 21.87 KG/M2 | WEIGHT: 123.44 LBS | SYSTOLIC BLOOD PRESSURE: 112 MMHG | HEIGHT: 63 IN | DIASTOLIC BLOOD PRESSURE: 80 MMHG

## 2020-01-16 DIAGNOSIS — N76.0 ACUTE VAGINITIS: Primary | ICD-10-CM

## 2020-01-16 LAB
CANDIDA RRNA VAG QL PROBE: NEGATIVE
G VAGINALIS RRNA GENITAL QL PROBE: POSITIVE
T VAGINALIS RRNA GENITAL QL PROBE: NEGATIVE

## 2020-01-16 PROCEDURE — 3008F PR BODY MASS INDEX (BMI) DOCUMENTED: ICD-10-PCS | Mod: CPTII,S$GLB,, | Performed by: NURSE PRACTITIONER

## 2020-01-16 PROCEDURE — 99999 PR PBB SHADOW E&M-EST. PATIENT-LVL III: ICD-10-PCS | Mod: PBBFAC,,, | Performed by: NURSE PRACTITIONER

## 2020-01-16 PROCEDURE — 99999 PR PBB SHADOW E&M-EST. PATIENT-LVL III: CPT | Mod: PBBFAC,,, | Performed by: NURSE PRACTITIONER

## 2020-01-16 PROCEDURE — 99213 PR OFFICE/OUTPT VISIT, EST, LEVL III, 20-29 MIN: ICD-10-PCS | Mod: S$GLB,,, | Performed by: NURSE PRACTITIONER

## 2020-01-16 PROCEDURE — 87510 GARDNER VAG DNA DIR PROBE: CPT

## 2020-01-16 PROCEDURE — 3008F BODY MASS INDEX DOCD: CPT | Mod: CPTII,S$GLB,, | Performed by: NURSE PRACTITIONER

## 2020-01-16 PROCEDURE — 99213 OFFICE O/P EST LOW 20 MIN: CPT | Mod: S$GLB,,, | Performed by: NURSE PRACTITIONER

## 2020-01-16 PROCEDURE — 87480 CANDIDA DNA DIR PROBE: CPT

## 2020-01-16 RX ORDER — METRONIDAZOLE 7.5 MG/G
1 GEL VAGINAL DAILY
Qty: 5 APPLICATOR | Refills: 1 | Status: SHIPPED | OUTPATIENT
Start: 2020-01-16 | End: 2020-01-21

## 2020-01-16 NOTE — PROGRESS NOTES
Chief Complaint   Patient presents with    Vaginitis       History of Present Illness: Nicolasa Lemon is a 24 y.o. female that presents today 2020   Pt [facility] c/o vaginal odor and discharge x 2 days. She denies any vaginal itching or burning. She reports that she does use vagisil body wash and scented prodcucts.  She denies trying any OTC medications. No other complaints or concerns noted.       History reviewed. No pertinent past medical history.    History reviewed. No pertinent surgical history.    Current Outpatient Medications   Medication Sig Dispense Refill    azithromycin (Z-DONALDO) 250 MG tablet Take 1 tablet (250 mg total) by mouth once daily. Take first 2 tablets together, then 1 every day until finished. 6 tablet 0    levonorgestrel (MIRENA) 20 mcg/24 hr (5 years) IUD 1 each by Intrauterine route once.      metoclopramide HCl (REGLAN) 10 MG tablet Take 1 tablet (10 mg total) by mouth every 6 (six) hours as needed. 15 tablet 0    metroNIDAZOLE (METROGEL) 0.75 % vaginal gel Place 1 applicator vaginally once daily. for 5 days 5 applicator 1     No current facility-administered medications for this visit.        Review of patient's allergies indicates:  No Known Allergies    Family History   Problem Relation Age of Onset    Vaginal cancer Neg Hx     Endometrial cancer Neg Hx     Cervical cancer Neg Hx        Social History     Tobacco Use    Smoking status: Never Smoker    Smokeless tobacco: Never Used   Substance Use Topics    Alcohol use: No     Frequency: Never    Drug use: No       OB History    Para Term  AB Living   0 0 0 0 0 0   SAB TAB Ectopic Multiple Live Births   0 0 0 0 0       Review of Symptoms:  GENERAL: Denies weight gain or weight loss. Feeling well overall.   SKIN: Denies rash or lesions.   HEAD: Denies head injury or headache.   NODES: Denies enlarged lymph nodes.   CHEST: Denies chest pain or shortness of breath.  "  CARDIOVASCULAR: Denies palpitations or left sided chest pain.   ABDOMEN: No abdominal pain, constipation, diarrhea, nausea, vomiting or rectal bleeding.   URINARY: No frequency, dysuria, hematuria, or burning on urination.    /80   Ht 5' 2.99" (1.6 m)   Wt 56 kg (123 lb 7.3 oz)   LMP 12/23/2019   Physical Exam:  APPEARANCE: Well nourished, well developed, in no acute distress.  SKIN: Normal skin turgor, no lesions.  NECK: Neck symmetric without masses   RESPIRATORY: Normal respiratory effort with no retractions or use of accessory muscles  ABDOMEN: Soft. No tenderness or masses. No hepatosplenomegaly. No hernias.  PELVIC: Normal external female genitalia without lesions. Normal hair distribution. Adequate perineal body, normal urethral meatus. Urethra with no masses.  Bladder nontender. Vagina moist and well rugated without lesions, + thin yellow/brown discharge; odor noted. Cervix pink and without lesions. No significant cystocele or rectocele.     ASSESSMENT/PLAN:  Acute vaginitis  -     Vaginosis Screen by DNA Probe    Other orders  -     metroNIDAZOLE (METROGEL) 0.75 % vaginal gel; Place 1 applicator vaginally once daily. for 5 days  Dispense: 5 applicator; Refill: 1      -Reinforced to avoid any scented products in the vaginal area such as bubble baths, bath bombs, scented soaps/body washes, douches, feminine washes, etc... Also wear cotton underwear and change out of wet/sweaty clothing as soon as possible. Pt verbalized understanding.      Follow-up:  Will f/u with results   RTC if symptoms worsen or do not improve  RTC as needed      "

## 2020-01-17 ENCOUNTER — TELEPHONE (OUTPATIENT)
Dept: OBSTETRICS AND GYNECOLOGY | Facility: CLINIC | Age: 25
End: 2020-01-17

## 2020-01-17 NOTE — TELEPHONE ENCOUNTER
----- Message from Rosi Hutchins NP sent at 1/17/2020  7:54 AM CST -----  Please call patient and let her know her vaginosis cultures came back positive for BV- not an STD just an overgrowth of bacteria in the vagina replacing normal vaginal suzy.  She is appropriately treated with the prescribed Metrogel.    Thanks,  Rosi

## 2020-01-17 NOTE — TELEPHONE ENCOUNTER
----- Message from Lindsey Borges sent at 1/17/2020  1:11 PM CST -----  Contact: RUPERT ORLANDO [[id number]]  Type:  Patient Returning Call    Who Called:     Who Left Message for Patient:     Does the patient know what this is regarding?: missed call     Best Call Back Number:   [phone]    Additional Information:  n/a

## 2020-05-18 ENCOUNTER — HOSPITAL ENCOUNTER (EMERGENCY)
Facility: OTHER | Age: 25
Discharge: HOME | End: 2020-05-18
Attending: EMERGENCY MEDICINE
Payer: COMMERCIAL

## 2020-05-18 VITALS
DIASTOLIC BLOOD PRESSURE: 56 MMHG | SYSTOLIC BLOOD PRESSURE: 102 MMHG | BODY MASS INDEX: 22.68 KG/M2 | TEMPERATURE: 98 F | HEART RATE: 92 BPM | HEIGHT: 63 IN | RESPIRATION RATE: 18 BRPM | OXYGEN SATURATION: 100 % | WEIGHT: 128 LBS

## 2020-05-18 DIAGNOSIS — R55 SYNCOPE: ICD-10-CM

## 2020-05-18 LAB
ALBUMIN SERPL BCP-MCNC: 4.3 G/DL (ref 3.5–5.2)
ALP SERPL-CCNC: 58 U/L (ref 55–135)
ALT SERPL W/O P-5'-P-CCNC: 29 U/L (ref 10–44)
ANION GAP SERPL CALC-SCNC: 14 MMOL/L (ref 8–16)
AST SERPL-CCNC: 26 U/L (ref 10–40)
B-HCG UR QL: NEGATIVE
BACTERIA #/AREA URNS HPF: ABNORMAL /HPF
BASOPHILS # BLD AUTO: 0.01 K/UL (ref 0–0.2)
BASOPHILS NFR BLD: 0.2 % (ref 0–1.9)
BILIRUB SERPL-MCNC: 0.8 MG/DL (ref 0.1–1)
BILIRUB UR QL STRIP: ABNORMAL
BUN SERPL-MCNC: 16 MG/DL (ref 6–20)
CALCIUM SERPL-MCNC: 10.2 MG/DL (ref 8.7–10.5)
CHLORIDE SERPL-SCNC: 103 MMOL/L (ref 95–110)
CLARITY UR: ABNORMAL
CO2 SERPL-SCNC: 24 MMOL/L (ref 23–29)
COLOR UR: YELLOW
CREAT SERPL-MCNC: 0.9 MG/DL (ref 0.5–1.4)
CTP QC/QA: YES
DIFFERENTIAL METHOD: NORMAL
EOSINOPHIL # BLD AUTO: 0 K/UL (ref 0–0.5)
EOSINOPHIL NFR BLD: 0.2 % (ref 0–8)
ERYTHROCYTE [DISTWIDTH] IN BLOOD BY AUTOMATED COUNT: 12.7 % (ref 11.5–14.5)
EST. GFR  (AFRICAN AMERICAN): >60 ML/MIN/1.73 M^2
EST. GFR  (NON AFRICAN AMERICAN): >60 ML/MIN/1.73 M^2
GLUCOSE SERPL-MCNC: 109 MG/DL (ref 70–110)
GLUCOSE UR QL STRIP: NEGATIVE
HCT VFR BLD AUTO: 42 % (ref 37–48.5)
HGB BLD-MCNC: 13.7 G/DL (ref 12–16)
HGB UR QL STRIP: ABNORMAL
HYALINE CASTS #/AREA URNS LPF: 0 /LPF
IMM GRANULOCYTES # BLD AUTO: 0.01 K/UL (ref 0–0.04)
IMM GRANULOCYTES NFR BLD AUTO: 0.2 % (ref 0–0.5)
KETONES UR QL STRIP: ABNORMAL
LEUKOCYTE ESTERASE UR QL STRIP: NEGATIVE
LIPASE SERPL-CCNC: 12 U/L (ref 4–60)
LYMPHOCYTES # BLD AUTO: 1.7 K/UL (ref 1–4.8)
LYMPHOCYTES NFR BLD: 27.5 % (ref 18–48)
MCH RBC QN AUTO: 30.8 PG (ref 27–31)
MCHC RBC AUTO-ENTMCNC: 32.6 G/DL (ref 32–36)
MCV RBC AUTO: 94 FL (ref 82–98)
MICROSCOPIC COMMENT: ABNORMAL
MONOCYTES # BLD AUTO: 0.4 K/UL (ref 0.3–1)
MONOCYTES NFR BLD: 6.8 % (ref 4–15)
NEUTROPHILS # BLD AUTO: 3.9 K/UL (ref 1.8–7.7)
NEUTROPHILS NFR BLD: 65.1 % (ref 38–73)
NITRITE UR QL STRIP: NEGATIVE
NRBC BLD-RTO: 0 /100 WBC
PH UR STRIP: 6 [PH] (ref 5–8)
PLATELET # BLD AUTO: 345 K/UL (ref 150–350)
PMV BLD AUTO: 11.8 FL (ref 9.2–12.9)
POTASSIUM SERPL-SCNC: 3.2 MMOL/L (ref 3.5–5.1)
PROT SERPL-MCNC: 8.3 G/DL (ref 6–8.4)
PROT UR QL STRIP: ABNORMAL
RBC # BLD AUTO: 4.45 M/UL (ref 4–5.4)
RBC #/AREA URNS HPF: 3 /HPF (ref 0–4)
SARS-COV-2 RDRP RESP QL NAA+PROBE: NEGATIVE
SODIUM SERPL-SCNC: 141 MMOL/L (ref 136–145)
SP GR UR STRIP: >=1.03 (ref 1–1.03)
SQUAMOUS #/AREA URNS HPF: 40 /HPF
URN SPEC COLLECT METH UR: ABNORMAL
UROBILINOGEN UR STRIP-ACNC: NEGATIVE EU/DL
WBC # BLD AUTO: 6.03 K/UL (ref 3.9–12.7)
WBC #/AREA URNS HPF: 3 /HPF (ref 0–5)

## 2020-05-18 PROCEDURE — 83690 ASSAY OF LIPASE: CPT

## 2020-05-18 PROCEDURE — 99284 EMERGENCY DEPT VISIT MOD MDM: CPT | Mod: 25

## 2020-05-18 PROCEDURE — 93005 ELECTROCARDIOGRAM TRACING: CPT

## 2020-05-18 PROCEDURE — 63600175 PHARM REV CODE 636 W HCPCS: Performed by: EMERGENCY MEDICINE

## 2020-05-18 PROCEDURE — U0002 COVID-19 LAB TEST NON-CDC: HCPCS

## 2020-05-18 PROCEDURE — 96361 HYDRATE IV INFUSION ADD-ON: CPT

## 2020-05-18 PROCEDURE — 81000 URINALYSIS NONAUTO W/SCOPE: CPT

## 2020-05-18 PROCEDURE — 85025 COMPLETE CBC W/AUTO DIFF WBC: CPT

## 2020-05-18 PROCEDURE — 96374 THER/PROPH/DIAG INJ IV PUSH: CPT

## 2020-05-18 PROCEDURE — 80053 COMPREHEN METABOLIC PANEL: CPT

## 2020-05-18 PROCEDURE — 25000003 PHARM REV CODE 250: Performed by: EMERGENCY MEDICINE

## 2020-05-18 PROCEDURE — 81025 URINE PREGNANCY TEST: CPT | Performed by: EMERGENCY MEDICINE

## 2020-05-18 RX ORDER — METOCLOPRAMIDE 10 MG/1
10 TABLET ORAL EVERY 6 HOURS PRN
Qty: 10 TABLET | Refills: 0 | Status: SHIPPED | OUTPATIENT
Start: 2020-05-18

## 2020-05-18 RX ORDER — METOCLOPRAMIDE HYDROCHLORIDE 5 MG/ML
10 INJECTION INTRAMUSCULAR; INTRAVENOUS
Status: COMPLETED | OUTPATIENT
Start: 2020-05-18 | End: 2020-05-18

## 2020-05-18 RX ORDER — ONDANSETRON 2 MG/ML
8 INJECTION INTRAMUSCULAR; INTRAVENOUS ONCE AS NEEDED
Status: DISCONTINUED | OUTPATIENT
Start: 2020-05-18 | End: 2020-05-18 | Stop reason: HOSPADM

## 2020-05-18 RX ORDER — ONDANSETRON 4 MG/1
4 TABLET, ORALLY DISINTEGRATING ORAL EVERY 6 HOURS PRN
Qty: 12 TABLET | Refills: 0 | Status: SHIPPED | OUTPATIENT
Start: 2020-05-18

## 2020-05-18 RX ADMIN — METOCLOPRAMIDE 10 MG: 5 INJECTION, SOLUTION INTRAMUSCULAR; INTRAVENOUS at 10:05

## 2020-05-18 RX ADMIN — SODIUM CHLORIDE 1000 ML: 0.9 INJECTION, SOLUTION INTRAVENOUS at 10:05

## 2020-05-18 NOTE — ED NOTES
"Pt c/o N/V/D x several days. States she drank heavily a couple days ago but "felt fine the day after." Denies drug use. intermittent vomiting noted. Denies abd pain, just states "my stomach just feels upset." Pt AAOx4 and appropriate at this time. Respirations even and unlabored. No acute distress noted. Pt updated on POC. Bed is locked and in lowest position with side rails up x2. Call bell within reach and pt oriented to use of call bell. Pt on continuous pulse ox, and continuous BP cuff. Will continue to monitor.     "

## 2020-05-18 NOTE — DISCHARGE INSTRUCTIONS
Call your primary care doctor to make the first available appointment.     Keep all your medical appointments.     Take your regular medication as prescribed. Contact your primary care provider before running out of medication, or for any problems obtaining them.    Do not drive or operate heavy machinery while taking opioid or muscle relaxing medications. Examples include norco, percocet, xanax, valium, flexeril.     Overuse or long term use of pain and sedating medication may lead to addiction, dependence, organ failure, family and work problems, legal problems, accidental overdose and death.    If you do not have health insurance, you probably qualify for heavily discounted rates:  Call 1-696.621.3157 ([facility] hotline) or go to www.[employer].[place].gov    Your evaluation in the ED does not suggest any emergent or life threatening medical condition requiring admission or immediate intervention beyond that provided in the ED.   However, the signs of a serious problem sometimes take more time to appear.   RETURN TO THE ER if any of the following occur:    Weakness, dizziness, fainting, or loss of consciousness   Fever of 100.4ºF (38ºC) or higher  Any worse symptoms  Any new or concerning symptoms

## 2020-05-18 NOTE — ED PROVIDER NOTES
"Encounter Date: 5/18/2020    SCRIBE #1 NOTE: I, Jackeline Cliff, am scribing for, and in the presence of, Dr. Dos Santos.       History     Chief Complaint   Patient presents with    Emesis     Vomiting for the past two days. Pt also reports diarrhea and left sided abd pain. Friend reports she had a syncopal episode yesterday and today but denies hitting her head     Time seen by provider: 10:29 AM    This is a 25 y.o. female who presents with complaint of N/V for the past 2 days. She admits to drinking alcohol 3 days ago and thought she had a "hangover" the following day. She states that N/V improved yesterday but noticed blood in her vomit today. She feels hot and cold when she vomits. She reports some diarrhea yesterday. She has difficulty tolerating food and medication orally. She denies any abdominal pain. She denies previous presentations like this. She has no surgical history of the abdomen. She denies additional complaints at this time.    The history is provided by the patient and medical records.     Review of patient's allergies indicates:  No Known Allergies  History reviewed. No pertinent past medical history.  History reviewed. No pertinent surgical history.  Family History   Problem Relation Age of Onset    Vaginal cancer Neg Hx     Endometrial cancer Neg Hx     Cervical cancer Neg Hx      Social History     Tobacco Use    Smoking status: Never Smoker    Smokeless tobacco: Never Used   Substance Use Topics    Alcohol use: No     Frequency: Never    Drug use: No     ROS: As per HPI and below:   General: No fever. Notes hot and cold flashes while vomiting.  HENT: No facial pain.   Eyes: Negative for eye pain.   Cardiovascular: No chest pain.   Respiratory:  No dyspnea.   GI: Notes decreased PO tolerance. No abdominal pain. Notes nausea. Notes vomiting. Notes blood in vomit today. Notes diarrhea yesterday.  Skin: No rashes.   Neuro:  No syncope.  No focal deficits.   Musculoskeletal: No extremity " "pain.  All other systems reviewed and are negative.    Physical Exam     Initial Vitals [05/18/20 0944]   BP Pulse Resp Temp SpO2   (!) 132/94 76 18 98.3 °F (36.8 °C) 100 %      MAP       --         Nursing note and vitals reviewed.  BP (!) 102/56 (BP Location: Left arm, Patient Position: Lying)   Pulse 92   Temp 98.2 °F (36.8 °C) (Oral)   Resp 18   Ht 5' 3" (1.6 m)   Wt 58.1 kg (128 lb)   SpO2 100%   BMI 22.67 kg/m²   Constitutional: AAOx3. Ill-appearing. Active emesis.  Eyes: EOMI. No discharge. Anicteric.  HENT:   Neck: Normal range of motion. Neck supple.  Cardiovascular: Normal rate.  Regular rhythm.    Pulmonary/Chest: No respiratory distress. Effort normal.  No tachypnea.  Abdominal: No abdominal tenderness. No distension and no mass.   Musculoskeletal: Normal range of motion.   Neurological: GCS 15. Alert and oriented to person, place, and time. No gross cranial nerve, light touch or strength deficit. Coordination normal.   Skin: Skin is warm and dry.   EXT: 2+ radial pulses.   Psychiatric: Behavior is normal. Judgment normal.    ED Course   Procedures  Labs Reviewed   COMPREHENSIVE METABOLIC PANEL - Abnormal; Notable for the following components:       Result Value    Potassium 3.2 (*)     All other components within normal limits   URINALYSIS, REFLEX TO URINE CULTURE - Abnormal; Notable for the following components:    Appearance, UA Cloudy (*)     Specific Gravity, UA >=1.030 (*)     Protein, UA 1+ (*)     Ketones, UA 3+ (*)     Bilirubin (UA) 1+ (*)     Occult Blood UA Trace (*)     All other components within normal limits    Narrative:     Preferred Collection Type->Urine, Clean Catch   URINALYSIS MICROSCOPIC - Abnormal; Notable for the following components:    Bacteria Few (*)     All other components within normal limits    Narrative:     Preferred Collection Type->Urine, Clean Catch   CBC W/ AUTO DIFFERENTIAL   LIPASE   SARS-COV-2 RNA AMPLIFICATION, QUAL    Narrative:     What symptom " criteria does the patient meet?->Other (specify)  Please specify:->diarrhea   POCT URINE PREGNANCY          Imaging Results          X-Ray Chest AP Portable (Final result)  Result time 05/18/20 10:59:57    Final result by Kiera Miller MD (05/18/20 10:59:57)                 Impression:      Normal exam.      Electronically signed by: Kiera Miller MD  Date:    05/18/2020  Time:    10:59             Narrative:    EXAMINATION:  XR CHEST AP PORTABLE    CLINICAL HISTORY:  syncope;    TECHNIQUE:  Single frontal view of the chest was performed.    COMPARISON:  03/11/2019    FINDINGS:  The lungs are symmetrically inflated with no mass, nodule, pneumothorax, airspace consolidation or pleural effusion.  The cardiomediastinal silhouette, osseous and soft tissue structures are normal.                                            Scribe Attestation:   Scribe #1: I performed the above scribed service and the documentation accurately describes the services I performed. I attest to the accuracy of the note.    Attending Attestation:           Physician Attestation for Scribe:  Physician Attestation Statement for Scribe #1: I, Dr. Dos Santos, reviewed documentation, as scribed by Jackeline Coronado in my presence, and it is both accurate and complete.         Attending ED Notes:   Pt is a 25 y.o. F with no reported past medical history who presents with persistent nausea, vomiting for the past several days associated with syncope.   On exam pt with ctive emesis, no abdominal tenderness.  Initial differential included gastroenteritis, dehdration, gross metabolic abnormality, hyperemesis gravidarum.            ED Course as of May 18 1420   Mon May 18, 2020   1101 I independently reviewed and interpreted EKG which shows normal sinus rhythm at 81 beats per minute, no STEMI, no ischemic changes, normal intervals.  No comparison immediately available.    [SF]   1157 I independently reviewed and interpreted labs which are notable for mild  hypokalemia of 3.2.  I independently reviewed and interpreted CXR which shows no pneumothorax, no focal consolidation, no cardiomegaly, no acute process.    [SF]   1251 Delay in disposition:  Urinalysis has been sent, listed as in process for quite some time, but its location and status is unclear.    [RC]   1301 Urinalysis with ketonuria, microscopic hematuria with minimal RBCs, high specific gravity.  Pt reports feeling much better.  She tolerated oral intake.  She states that she feels comfortable with discharge home.   I discussed with patient and/or guardian/caretaker that this evaluation in the ED does not suggest any emergent or life threatening medical condition requiring admission or immediate intervention beyond what was provided in the ED. Regardless, an unremarkable evaluation in the ED does not preclude the development or presence of a serious or life threatening condition. As such, patient was instructed to seek medical assistance for any worsening or change in current symptoms.     I had a detailed discussion with patient  and/or guardian/caretaker regarding findings, plan, return precautions, importance of medication adherence, need to follow-up with a PCP. All questions answered.     Management decisions for this encounter made amidst early acute phase of COVID19 public health emergency; emergency medicine workup standards and admission vs. discharge standards have necessarily shifted.     Note was created using voice recognition software. It may have occasional typographical errors not identified and edited despite initial review prior to signing.        [RC]      ED Course User Index  [RC] Ambrosio Dos Santos MD  [SF] Jackeline Coronado                Clinical Impression:     1. Syncope                ED Disposition Condition    Discharge Good        ED Prescriptions     Medication Sig Dispense Start Date End Date Auth. Provider    ondansetron (ZOFRAN-ODT) 4 MG TbDL Take 1 tablet (4 mg total) by mouth  every 6 (six) hours as needed (nausea or vomiting). 12 tablet 5/18/2020  Ambrosio Dos Santos MD    metoclopramide HCl (REGLAN) 10 MG tablet Take 1 tablet (10 mg total) by mouth every 6 (six) hours as needed (headache, nausea or vomiting). 10 tablet 5/18/2020  Ambrosio Dos Santos MD        Follow-up Information     Follow up With Specialties Details Why Contact Info Additional Information    Your primary care doctor  In 2 days For recheck with your primary care doctor      [facility] [place] [address] Internal Medicine Schedule an appointment as soon as possible for a visit in 1 day To start seeing a primary care doctor, if you do not have one [facility] [address] [phone] Internal Medicine - [facility], 8th Floor, [address] Please park in [place] and use [place] elevators    [facility] GASTROENTEROLOGY Gastroenterology In 1 week if your nausea or acid reflux continue [facility] [address]  [phone]                                      Ambrosio Dos Santos MD  05/18/20 142

## 2020-05-18 NOTE — ED NOTES
Appearance: Pt awake, alert & oriented to person, place & time. Pt in no acute distress at present time. Pt is clean and well groomed with clothes appropriately fastened. Denies recent fevers, chills, cough.   Skin: Skin warm, dry & intact. Color consistent with ethnicity. Mucous membranes dry. No breakdown or brusing noted.   Musculoskeletal: Patient moving all extremities well, no obvious swelling or deformities noted. Ambulates with steady gait.   Respiratory: Respirations spontaneous, even, and non-labored. Visible chest rise noted. Airway is open and patent. No accessory muscle use noted.   Neurologic: Sensation is intact. Speech is clear and appropriate. Eyes open spontaneously, behavior appropriate to situation, follows commands,  purposeful motor response noted.   Cardiac:  No Bilateral lower extremity edema. Cap refill is <3 seconds. Pt denies active chest pains, SOB.   Abdomen: Pt denies active abd pains, cramping or discomfort. N/V present. Diarrhea reported.   : Pt reports no dysuria or hematuria.

## 2020-05-20 ENCOUNTER — OFFICE VISIT (OUTPATIENT)
Dept: UROGYNECOLOGY | Facility: CLINIC | Age: 25
End: 2020-05-20
Payer: COMMERCIAL

## 2020-05-20 ENCOUNTER — HOSPITAL ENCOUNTER (OUTPATIENT)
Dept: RADIOLOGY | Facility: OTHER | Age: 25
Discharge: HOME | End: 2020-05-20
Attending: NURSE PRACTITIONER
Payer: COMMERCIAL

## 2020-05-20 ENCOUNTER — TELEPHONE (OUTPATIENT)
Dept: UROGYNECOLOGY | Facility: CLINIC | Age: 25
End: 2020-05-20

## 2020-05-20 VITALS
DIASTOLIC BLOOD PRESSURE: 80 MMHG | WEIGHT: 112.63 LBS | BODY MASS INDEX: 19.96 KG/M2 | SYSTOLIC BLOOD PRESSURE: 108 MMHG | HEIGHT: 63 IN

## 2020-05-20 DIAGNOSIS — T83.32XA DISPLACEMENT OF INTRAUTERINE CONTRACEPTIVE DEVICE, INITIAL ENCOUNTER: ICD-10-CM

## 2020-05-20 DIAGNOSIS — T83.32XA MALPOSITIONED INTRAUTERINE DEVICE (IUD), INITIAL ENCOUNTER: ICD-10-CM

## 2020-05-20 DIAGNOSIS — Z30.011 ENCOUNTER FOR INITIAL PRESCRIPTION OF CONTRACEPTIVE PILLS: Primary | ICD-10-CM

## 2020-05-20 DIAGNOSIS — N89.8 VAGINAL DISCHARGE: ICD-10-CM

## 2020-05-20 DIAGNOSIS — T83.32XA DISPLACEMENT OF INTRAUTERINE CONTRACEPTIVE DEVICE, INITIAL ENCOUNTER: Primary | ICD-10-CM

## 2020-05-20 PROCEDURE — 99214 OFFICE O/P EST MOD 30 MIN: CPT | Mod: 25,S$GLB,, | Performed by: NURSE PRACTITIONER

## 2020-05-20 PROCEDURE — 76830 TRANSVAGINAL US NON-OB: CPT | Mod: TC

## 2020-05-20 PROCEDURE — 76856 US PELVIS COMP WITH TRANSVAG NON-OB (XPD): ICD-10-PCS | Mod: 26,,, | Performed by: RADIOLOGY

## 2020-05-20 PROCEDURE — 58301 REMOVE INTRAUTERINE DEVICE: CPT | Mod: S$GLB,,, | Performed by: NURSE PRACTITIONER

## 2020-05-20 PROCEDURE — 87481 CANDIDA DNA AMP PROBE: CPT | Mod: 59

## 2020-05-20 PROCEDURE — 87070 CULTURE OTHR SPECIMN AEROBIC: CPT

## 2020-05-20 PROCEDURE — 76830 US PELVIS COMP WITH TRANSVAG NON-OB (XPD): ICD-10-PCS | Mod: 26,,, | Performed by: RADIOLOGY

## 2020-05-20 PROCEDURE — 87801 DETECT AGNT MULT DNA AMPLI: CPT

## 2020-05-20 PROCEDURE — 76856 US EXAM PELVIC COMPLETE: CPT | Mod: 26,,, | Performed by: RADIOLOGY

## 2020-05-20 PROCEDURE — 99214 PR OFFICE/OUTPT VISIT, EST, LEVL IV, 30-39 MIN: ICD-10-PCS | Mod: 25,S$GLB,, | Performed by: NURSE PRACTITIONER

## 2020-05-20 PROCEDURE — 99999 PR PBB SHADOW E&M-EST. PATIENT-LVL III: CPT | Mod: PBBFAC,,, | Performed by: NURSE PRACTITIONER

## 2020-05-20 PROCEDURE — 99999 PR PBB SHADOW E&M-EST. PATIENT-LVL III: ICD-10-PCS | Mod: PBBFAC,,, | Performed by: NURSE PRACTITIONER

## 2020-05-20 PROCEDURE — 3008F PR BODY MASS INDEX (BMI) DOCUMENTED: ICD-10-PCS | Mod: CPTII,S$GLB,, | Performed by: NURSE PRACTITIONER

## 2020-05-20 PROCEDURE — 87661 TRICHOMONAS VAGINALIS AMPLIF: CPT

## 2020-05-20 PROCEDURE — 3008F BODY MASS INDEX DOCD: CPT | Mod: CPTII,S$GLB,, | Performed by: NURSE PRACTITIONER

## 2020-05-20 PROCEDURE — 76830 TRANSVAGINAL US NON-OB: CPT | Mod: 26,,, | Performed by: RADIOLOGY

## 2020-05-20 PROCEDURE — 58301 REMOVAL OF INTRAUTERINE DEVICE-TODAY: ICD-10-PCS | Mod: S$GLB,,, | Performed by: NURSE PRACTITIONER

## 2020-05-20 RX ORDER — DOXYCYCLINE 100 MG/1
100 CAPSULE ORAL 2 TIMES DAILY
COMMUNITY

## 2020-05-20 RX ORDER — ACETAMINOPHEN AND CODEINE PHOSPHATE 300; 60 MG/1; MG/1
1-2 TABLET ORAL EVERY 6 HOURS PRN
COMMUNITY

## 2020-05-20 RX ORDER — METRONIDAZOLE 500 MG/1
500 TABLET ORAL EVERY 12 HOURS
COMMUNITY

## 2020-05-20 RX ORDER — NORETHINDRONE ACETATE AND ETHINYL ESTRADIOL 1MG-20(21)
1 KIT ORAL DAILY
Qty: 28 TABLET | Refills: 11 | Status: SHIPPED | OUTPATIENT
Start: 2020-05-20 | End: 2021-05-28

## 2020-05-20 NOTE — PATIENT INSTRUCTIONS
1. IUD malpositioned  --removed today  --start ocp  --use backup method x 2 weeks    2. Vaginal discharge  --affirm today  --continue flagyl  --stop doxycycline    3. Elevated amylase  --will repeat labs in 2 weeks  --orders placed    4. RTC for annual in 4 months

## 2020-05-20 NOTE — TELEPHONE ENCOUNTER
Spoke with patient.  Patient request an appointment for today with Rosa Benoit.  Appointment on 5/20/20 at 2:30 pm.  Patient agreed to date and time.  Call ended

## 2020-05-20 NOTE — PROGRESS NOTES
05/20/2020    SUBJECTIVE:   25 y.o. female for complaints of nausea, vomiting, stomach pain. Had Ct yesterday, showed IUD was in the incorrect position.  Pelvic ultrasound today shows IUD in lower uterus.    No past medical history on file.    No past surgical history on file.    Family History   Problem Relation Age of Onset    Vaginal cancer Neg Hx     Endometrial cancer Neg Hx     Cervical cancer Neg Hx        Social History     Socioeconomic History    Marital status: Single     Spouse name: Not on file    Number of children: Not on file    Years of education: Not on file    Highest education level: Not on file   Occupational History    Not on file   Social Needs    Financial resource strain: Not on file    Food insecurity:     Worry: Not on file     Inability: Not on file    Transportation needs:     Medical: Not on file     Non-medical: Not on file   Tobacco Use    Smoking status: Never Smoker    Smokeless tobacco: Never Used   Substance and Sexual Activity    Alcohol use: No     Frequency: Never    Drug use: No    Sexual activity: Yes     Partners: Male   Lifestyle    Physical activity:     Days per week: Not on file     Minutes per session: Not on file    Stress: Not on file   Relationships    Social connections:     Talks on phone: Not on file     Gets together: Not on file     Attends Bahai service: Not on file     Active member of club or organization: Not on file     Attends meetings of clubs or organizations: Not on file     Relationship status: Not on file   Other Topics Concern    Not on file   Social History Narrative    Not on file       Current Outpatient Medications   Medication Sig Dispense Refill    acetaminophen-codeine 300-60mg (TYLENOL #4) 300-60 mg Tab Take 1-2 tablets by mouth every 6 (six) hours as needed.      doxycycline (VIBRAMYCIN) 100 MG Cap Take 100 mg by mouth 2 (two) times daily.      metoclopramide HCl (REGLAN) 10 MG tablet Take 1 tablet (10 mg total)  "by mouth every 6 (six) hours as needed (headache, nausea or vomiting). 10 tablet 0    metroNIDAZOLE (FLAGYL) 500 MG tablet Take 500 mg by mouth every 12 (twelve) hours.      ondansetron (ZOFRAN-ODT) 4 MG TbDL Take 1 tablet (4 mg total) by mouth every 6 (six) hours as needed (nausea or vomiting). 12 tablet 0    azithromycin (Z-DONALDO) 250 MG tablet Take 1 tablet (250 mg total) by mouth once daily. Take first 2 tablets together, then 1 every day until finished. (Patient not taking: Reported on 2020) 6 tablet 0    norethindrone-ethinyl estradiol (JUNEL FE 1/20) 1 mg-20 mcg (21)/75 mg (7) per tablet Take 1 tablet by mouth once daily. 28 tablet 11     No current facility-administered medications for this visit.        Review of patient's allergies indicates:  No Known Allergies    No LMP recorded. Patient has had an implant.      OB History        0    Para   0    Term   0       0    AB   0    Living   0       SAB   0    TAB   0    Ectopic   0    Multiple   0    Live Births   0                   ROS:  Feeling well.   No dyspnea or chest pain on exertion.    No abdominal pain, change in bowel habits, black or bloody stools.    No urinary tract symptoms.   GYN ROS: she complains of abdominal pain.   No neurological complaints.    OBJECTIVE:   The patient appears well, alert, oriented x 3, in no distress.  /80 (BP Location: Right arm, Patient Position: Sitting)   Ht 5' 3" (1.6 m)   Wt 51.1 kg (112 lb 10.5 oz)   BMI 19.96 kg/m²   Abdomen soft without tenderness, guarding, mass or organomegaly.   Extremities show no edema, normal peripheral pulses.   Neurological is normal, no focal findings.      PELVIC EXAM:   VULVA: normal appearing vulva with no masses, tenderness or lesions,   VAGINA: normal appearing vagina with normal color and discharge, no lesions,  CERVIX: normal appearing cervix without discharge or lesions, DNA probe for chlamydia and GC obtained, IUD strings presentt,   UTERUS: uterus " is normal size, shape, consistency and nontender,   ADNEXA: no masses,   RECTAL: deferred          ASSESSMENT:   1. Encounter for initial prescription of contraceptive pills  norethindrone-ethinyl estradiol (JUNEL FE 1/20) 1 mg-20 mcg (21)/75 mg (7) per tablet   2. Malpositioned intrauterine device (IUD), initial encounter  Removal of Intrauterine Device-Today   3. Vaginal discharge  Vaginosis Screen by DNA Probe    Genital Culture       PLAN:   1. IUD malpositioned  --removed today  --start ocp  --use backup method x 2 weeks    2. Vaginal discharge  --affirm today  --continue flagyl  --stop doxycycline    3. Elevated amylase  --will repeat labs in 2 weeks  --orders placed    4. RTC for annual in 4 months        30 minutes were spent in face to face time with this patient  90 % of this time was spent in counseling and/or coordination of care  Rosa NASH [facility]  Division of Female Pelvic Medicine and Reconstructive Surgery  Department of Obstetrics & Gynecology

## 2020-05-20 NOTE — TELEPHONE ENCOUNTER
----- Message from Nia Valderrama sent at 5/20/2020  7:20 AM CDT -----  Contact: self  Type:  Same Day Appointment Request    Name of Caller:patient states went to ER on yesterday was advised Mirena is displace and you have PID need Mirena removed   Patient need appointment for today  When is the first available appointment?  Symptoms:  Best Call Back Number: [phone]  Additional Information:

## 2020-05-20 NOTE — PROGRESS NOTES
Patient with PID yesterday at [facility]. CT of abdomen/ pelvis done yesterday at [facility]-- results not available in care everywhere.  She was told her IUD was misplaced. Pelvic ultrasound ordered prior to visit today. She will try and get CT results.  STEPAN Rashid-BC

## 2020-05-22 LAB
BACTERIAL VAGINOSIS DNA: POSITIVE
CANDIDA GLABRATA DNA: NEGATIVE
CANDIDA KRUSEI DNA: NEGATIVE
CANDIDA RRNA VAG QL PROBE: NEGATIVE
T VAGINALIS RRNA GENITAL QL PROBE: NEGATIVE

## 2020-05-23 LAB — BACTERIA GENITAL AEROBE CULT: NO GROWTH

## 2020-05-24 ENCOUNTER — TELEPHONE (OUTPATIENT)
Dept: UROGYNECOLOGY | Facility: CLINIC | Age: 25
End: 2020-05-24

## 2020-05-25 ENCOUNTER — TELEPHONE (OUTPATIENT)
Dept: UROGYNECOLOGY | Facility: CLINIC | Age: 25
End: 2020-05-25

## 2020-05-25 NOTE — PROCEDURES
Removal of Intrauterine Device-Today  Date/Time: 5/20/2020 2:30 PM  Performed by: Rosa Benoit NP  Authorized by: Rosa Benoit NP   Preparation: Patient was prepped and draped in the usual sterile fashion.  Local anesthesia used: no    Anesthesia:  Local anesthesia used: no    Sedation:  Patient sedated: no    Patient tolerance: Patient tolerated the procedure well with no immediate complications  Comments: IUD stings grasped with ring forcepts.  IUD removed without difficulty-- device intact.  Patient tolerated without any complaints. STEPAN Rashid-BC

## 2020-05-25 NOTE — TELEPHONE ENCOUNTER
Attempted to reach patient regarding results of culture. Messaged left for patient to call office at [phone].Patient's my [facility] portal set up pending.

## 2020-05-26 ENCOUNTER — TELEPHONE (OUTPATIENT)
Dept: UROGYNECOLOGY | Facility: CLINIC | Age: 25
End: 2020-05-26

## 2020-05-26 NOTE — TELEPHONE ENCOUNTER
Called patient.  Call went directly to voice mail.  Left message that the flagyl she was taking should have been sufficient to treat the bacterial vaginosis and the culture of her IUD was normal.  Call ended.

## 2021-06-11 DIAGNOSIS — Z30.011 ENCOUNTER FOR INITIAL PRESCRIPTION OF CONTRACEPTIVE PILLS: ICD-10-CM

## 2021-06-11 RX ORDER — NORETHINDRONE ACETATE AND ETHINYL ESTRADIOL 1MG-20(21)
KIT ORAL
Qty: 30 TABLET | Refills: 0 | Status: SHIPPED | OUTPATIENT
Start: 2021-06-11 | End: 2021-06-28

## 2021-06-14 ENCOUNTER — TELEPHONE (OUTPATIENT)
Dept: UROGYNECOLOGY | Facility: CLINIC | Age: 26
End: 2021-06-14

## 2024-10-18 PROCEDURE — 99283 EMERGENCY DEPT VISIT LOW MDM: CPT

## 2024-10-19 ENCOUNTER — HOSPITAL ENCOUNTER (EMERGENCY)
Facility: HOSPITAL | Age: 29
Discharge: HOME | End: 2024-10-19
Attending: EMERGENCY MEDICINE

## 2024-10-19 VITALS
SYSTOLIC BLOOD PRESSURE: 149 MMHG | HEART RATE: 92 BPM | RESPIRATION RATE: 18 BRPM | TEMPERATURE: 99 F | WEIGHT: 139.56 LBS | OXYGEN SATURATION: 100 % | DIASTOLIC BLOOD PRESSURE: 98 MMHG | HEIGHT: 64 IN | BODY MASS INDEX: 23.82 KG/M2

## 2024-10-19 DIAGNOSIS — J45.909 ASTHMA, UNSPECIFIED ASTHMA SEVERITY, UNSPECIFIED WHETHER COMPLICATED, UNSPECIFIED WHETHER PERSISTENT: Primary | ICD-10-CM

## 2024-10-19 LAB
INFLUENZA A, MOLECULAR: NEGATIVE
INFLUENZA B, MOLECULAR: NEGATIVE
SARS-COV-2 RDRP RESP QL NAA+PROBE: NEGATIVE
SPECIMEN SOURCE: NORMAL

## 2024-10-19 PROCEDURE — 87502 INFLUENZA DNA AMP PROBE: CPT | Performed by: EMERGENCY MEDICINE

## 2024-10-19 PROCEDURE — 87635 SARS-COV-2 COVID-19 AMP PRB: CPT | Performed by: EMERGENCY MEDICINE

## 2024-10-19 RX ORDER — ALBUTEROL SULFATE 90 UG/1
1-2 INHALANT RESPIRATORY (INHALATION) EVERY 4 HOURS PRN
Qty: 18 G | Refills: 0 | Status: SHIPPED | OUTPATIENT
Start: 2024-10-19 | End: 2025-10-19

## 2024-10-19 NOTE — ED PROVIDER NOTES
Encounter Date: 10/18/2024       History     Chief Complaint   Patient presents with    COVID-19 Concerns     Pt c/o cough, generalized weakness and sore throat, reports her boss has covid.     29 female PMH of exercise induced asthma as a child presents w/ sore throat, productive cough, and weakness x2 days. Pt states that her symptoms feel like Covid, and her boss at work recently tested positive for covid. She denies any other sx including chest pain, N/V, fevers, chills, or irregular bowel or bladder function. States that she has been taking mucinex which has helped some for her sx.    The history is provided by the patient. No  was used.     Review of patient's allergies indicates:  No Known Allergies  History reviewed. No pertinent past medical history.  History reviewed. No pertinent surgical history.  Family History   Problem Relation Name Age of Onset    Vaginal cancer Neg Hx      Endometrial cancer Neg Hx      Cervical cancer Neg Hx       Social History     Tobacco Use    Smoking status: Never    Smokeless tobacco: Never   Substance Use Topics    Alcohol use: No    Drug use: No     Review of Systems    Physical Exam     Initial Vitals [10/18/24 2328]   BP Pulse Resp Temp SpO2   139/86 (!) 116 18 98.5 °F (36.9 °C) 98 %      MAP       --         Physical Exam    Constitutional: She appears well-developed and well-nourished.   HENT:   Head: Normocephalic and atraumatic.   Eyes: EOM are normal. Pupils are equal, round, and reactive to light.   Cardiovascular:  Regular rhythm and intact distal pulses.           Pulmonary/Chest: Breath sounds normal. No respiratory distress.   Abdominal: Bowel sounds are normal.     Neurological: She is oriented to person, place, and time.         ED Course   Procedures  Labs Reviewed   INFLUENZA A & B BY MOLECULAR   HIV 1 / 2 ANTIBODY   HEPATITIS C ANTIBODY   SARS-COV-2 RNA AMPLIFICATION, QUAL          Imaging Results              X-Ray Chest AP Portable  (Final result)  Result time 10/19/24 02:08:19      Final result by Brandon Singer, DO (10/19/24 02:08:19)                   Impression:      No acute abnormality.      Electronically signed by: Brandon Singer  Date:    10/19/2024  Time:    02:08               Narrative:    EXAMINATION:  XR CHEST AP PORTABLE    CLINICAL HISTORY:  pneumonia;    TECHNIQUE:  Single frontal view of the chest was performed.    COMPARISON:  05/18/2020.    FINDINGS:  The lungs are well expanded and clear. No focal opacities are seen. The pleural spaces are clear. The cardiac silhouette is unremarkable. There is dextroconvex scoliosis of the thoracic spine.  Osseous structures are otherwise unremarkable.                                    X-Rays:   Independently Interpreted Readings:   Other Readings:  CXR no abnormalities    Medications - No data to display  Medical Decision Making  29 female PMH of exercise induced asthma as a child presents w/ sore throat, productive cough, and weakness x2 days. DDX includes but not limited to Covid, Asthma exacerbation, Pneumonia, other URI. Workup w/ CXR and Covid test.     Pt has been afebrile and HDS since admit. Lung sounds normal. CXR showed no acute abnormality. Sx thought to be 2/2 URI. She stated that she had used her boyfriend's inhaler earlier when she was feeling some SOB which is thought to explain her tachycardia. Explained to the pt that the treatment for Covid in her case would be supportive measures which she has been doing already. She stated that she would like to be tested for Covid still, and is downloading the [employer] deanna to follow up results. She was given a refill of her own inhaler plus a spacer so that she could use it as needed in the future for asthma symptoms. She was told to return to work after 48 hours afebrile.    Amount and/or Complexity of Data Reviewed  Radiology: ordered.    Risk  Prescription drug management.                                      Clinical  Impression:  Final diagnoses:  [J45.909] Asthma, unspecified asthma severity, unspecified whether complicated, unspecified whether persistent (Primary)          ED Disposition Condition    Discharge Stable          ED Prescriptions       Medication Sig Dispense Start Date End Date Auth. Provider    albuterol (PROVENTIL/VENTOLIN HFA) 90 mcg/actuation inhaler Inhale 1-2 puffs into the lungs every 4 (four) hours as needed for Wheezing or Shortness of Breath. Rescue 18 g 10/19/2024 10/19/2025 Carmen Stoner MD          Follow-up Information       Follow up With Specialties Details Why Contact Info    [facility] - Emergency Dept Emergency Medicine  If symptoms worsen [address]  [facility] [address] [phone]             Rick Gray MD  Resident  10/19/24 5629

## 2024-10-19 NOTE — Clinical Note
"Nicolasa"Kolton Lemon was seen and treated in our emergency department on 10/18/2024.  She may return to work on 10/20/2024.       If you have any questions or concerns, please don't hesitate to call.      Carmen Stoner MD"

## 2024-10-19 NOTE — ED TRIAGE NOTES
Nicolasa Lemon, a 29 y.o. female presents to the ED w/ complaint of cough, sore throat, and generalized weakness for the last 3 days. PT states her boss at work tested positive for covid.     Triage note:  Chief Complaint   Patient presents with    COVID-19 Concerns     Pt c/o cough, generalized weakness and sore throat, reports her boss has covid.     Review of patient's allergies indicates:  No Known Allergies  History reviewed. No pertinent past medical history.

## 2024-11-22 ENCOUNTER — PATIENT MESSAGE (OUTPATIENT)
Dept: RESEARCH | Facility: HOSPITAL | Age: 29
End: 2024-11-22

## 2025-03-26 ENCOUNTER — LAB VISIT (OUTPATIENT)
Dept: LAB | Facility: HOSPITAL | Age: 30
End: 2025-03-26
Payer: COMMERCIAL

## 2025-03-26 ENCOUNTER — OFFICE VISIT (OUTPATIENT)
Dept: INTERNAL MEDICINE | Facility: CLINIC | Age: 30
End: 2025-03-26
Payer: COMMERCIAL

## 2025-03-26 VITALS
OXYGEN SATURATION: 100 % | HEIGHT: 64 IN | BODY MASS INDEX: 24.5 KG/M2 | WEIGHT: 143.5 LBS | HEART RATE: 98 BPM | DIASTOLIC BLOOD PRESSURE: 77 MMHG | SYSTOLIC BLOOD PRESSURE: 128 MMHG

## 2025-03-26 DIAGNOSIS — Z23 NEED FOR VACCINATION: ICD-10-CM

## 2025-03-26 DIAGNOSIS — Z00.00 ANNUAL PHYSICAL EXAM: Primary | ICD-10-CM

## 2025-03-26 DIAGNOSIS — Z00.00 ANNUAL PHYSICAL EXAM: ICD-10-CM

## 2025-03-26 LAB
ABSOLUTE EOSINOPHIL (OHS): 0.13 K/UL
ABSOLUTE MONOCYTE (OHS): 0.44 K/UL (ref 0.3–1)
ABSOLUTE NEUTROPHIL COUNT (OHS): 2.33 K/UL (ref 1.8–7.7)
ALBUMIN SERPL BCP-MCNC: 3.7 G/DL (ref 3.5–5.2)
ALP SERPL-CCNC: 53 UNIT/L (ref 40–150)
ALT SERPL W/O P-5'-P-CCNC: 8 UNIT/L (ref 10–44)
ANION GAP (OHS): 9 MMOL/L (ref 8–16)
AST SERPL-CCNC: 17 UNIT/L (ref 11–45)
BASOPHILS # BLD AUTO: 0.01 K/UL
BASOPHILS NFR BLD AUTO: 0.2 %
BILIRUB SERPL-MCNC: 0.5 MG/DL (ref 0.1–1)
BUN SERPL-MCNC: 14 MG/DL (ref 6–20)
CALCIUM SERPL-MCNC: 8.9 MG/DL (ref 8.7–10.5)
CHLORIDE SERPL-SCNC: 104 MMOL/L (ref 95–110)
CHOLEST SERPL-MCNC: 173 MG/DL (ref 120–199)
CHOLEST/HDLC SERPL: 1.9 {RATIO} (ref 2–5)
CO2 SERPL-SCNC: 22 MMOL/L (ref 23–29)
CREAT SERPL-MCNC: 0.6 MG/DL (ref 0.5–1.4)
EAG (OHS): 91 MG/DL (ref 68–131)
ERYTHROCYTE [DISTWIDTH] IN BLOOD BY AUTOMATED COUNT: 13.2 % (ref 11.5–14.5)
GFR SERPLBLD CREATININE-BSD FMLA CKD-EPI: >60 ML/MIN/1.73/M2
GLUCOSE SERPL-MCNC: 75 MG/DL (ref 70–110)
HBA1C MFR BLD: 4.8 % (ref 4–5.6)
HCT VFR BLD AUTO: 37.9 % (ref 37–48.5)
HCV AB SERPL QL IA: NORMAL
HDLC SERPL-MCNC: 91 MG/DL (ref 40–75)
HDLC SERPL: 52.6 % (ref 20–50)
HGB BLD-MCNC: 11.9 GM/DL (ref 12–16)
IMM GRANULOCYTES # BLD AUTO: 0.01 K/UL (ref 0–0.04)
IMM GRANULOCYTES NFR BLD AUTO: 0.2 % (ref 0–0.5)
LDLC SERPL CALC-MCNC: 61 MG/DL (ref 63–159)
LYMPHOCYTES # BLD AUTO: 1.79 K/UL (ref 1–4.8)
MCH RBC QN AUTO: 31.4 PG (ref 27–50)
MCHC RBC AUTO-ENTMCNC: 31.4 G/DL (ref 32–36)
MCV RBC AUTO: 100 FL (ref 82–98)
NONHDLC SERPL-MCNC: 82 MG/DL
NUCLEATED RBC (/100WBC) (OHS): 0 /100 WBC
PLATELET # BLD AUTO: 288 K/UL (ref 150–450)
PMV BLD AUTO: 11.9 FL (ref 9.2–12.9)
POTASSIUM SERPL-SCNC: 3.9 MMOL/L (ref 3.5–5.1)
PROT SERPL-MCNC: 7.4 GM/DL (ref 6–8.4)
RBC # BLD AUTO: 3.79 M/UL (ref 4–5.4)
RELATIVE EOSINOPHIL (OHS): 2.8 %
RELATIVE LYMPHOCYTE (OHS): 38 % (ref 18–48)
RELATIVE MONOCYTE (OHS): 9.3 % (ref 4–15)
RELATIVE NEUTROPHIL (OHS): 49.5 % (ref 38–73)
SODIUM SERPL-SCNC: 135 MMOL/L (ref 136–145)
TRIGL SERPL-MCNC: 105 MG/DL (ref 30–150)
TSH SERPL-ACNC: 1.05 UIU/ML (ref 0.4–4)
WBC # BLD AUTO: 4.71 K/UL (ref 3.9–12.7)

## 2025-03-26 PROCEDURE — 80053 COMPREHEN METABOLIC PANEL: CPT

## 2025-03-26 PROCEDURE — 85025 COMPLETE CBC W/AUTO DIFF WBC: CPT

## 2025-03-26 PROCEDURE — 99999 PR PBB SHADOW E&M-EST. PATIENT-LVL III: CPT | Mod: PBBFAC,,, | Performed by: INTERNAL MEDICINE

## 2025-03-26 PROCEDURE — 86762 RUBELLA ANTIBODY: CPT

## 2025-03-26 PROCEDURE — 99395 PREV VISIT EST AGE 18-39: CPT | Mod: S$GLB,,, | Performed by: INTERNAL MEDICINE

## 2025-03-26 PROCEDURE — 84443 ASSAY THYROID STIM HORMONE: CPT

## 2025-03-26 PROCEDURE — 86765 RUBEOLA ANTIBODY: CPT

## 2025-03-26 PROCEDURE — 86735 MUMPS ANTIBODY: CPT

## 2025-03-26 PROCEDURE — 86480 TB TEST CELL IMMUN MEASURE: CPT

## 2025-03-26 PROCEDURE — 86787 VARICELLA-ZOSTER ANTIBODY: CPT

## 2025-03-26 PROCEDURE — 36415 COLL VENOUS BLD VENIPUNCTURE: CPT

## 2025-03-26 PROCEDURE — 86706 HEP B SURFACE ANTIBODY: CPT

## 2025-03-26 PROCEDURE — 83036 HEMOGLOBIN GLYCOSYLATED A1C: CPT

## 2025-03-26 PROCEDURE — 86803 HEPATITIS C AB TEST: CPT

## 2025-03-26 PROCEDURE — 80061 LIPID PANEL: CPT

## 2025-03-26 NOTE — PROGRESS NOTES
Subjective:       Patient ID: Nicolasa Lemon is a 30 y.o. female.    Chief Complaint: Establish Care    HPI    Presents to establish care.     Feeling well today no new complaints.    She has not take any medications currently.    No surgical history.    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  She denies any shortness of breath or cough.    Health Maintenance:  Colon Cancer Screening:  Begin at age 45  Mammogram:  Begin at age 42  HIV:  Negative 2022  Hep C:  Ordered today  Lipids:  Ordered today  Pap Smear:  Last done in 2018.  Needs to establish with gyn.  Vaccines:  Flu shot today.  Check titers today.    Never smoker.   In Nursing school.       Father had brain cancer.   Mother with lupus and heart failure.     Review of Systems   Constitutional:  Negative for activity change, appetite change and chills.   HENT:  Negative for ear pain, sinus pressure/congestion and sneezing.    Respiratory:  Negative for cough and shortness of breath.    Cardiovascular:  Negative for chest pain, palpitations and leg swelling.   Gastrointestinal:  Negative for abdominal distention, abdominal pain, constipation, diarrhea, nausea and vomiting.   Genitourinary:  Negative for dysuria and hematuria.   Musculoskeletal:  Negative for arthralgias, back pain and myalgias.   Neurological:  Negative for dizziness and headaches.   Psychiatric/Behavioral:  Negative for agitation. The patient is not nervous/anxious.            No past medical history on file.  No past surgical history on file.   Problem List[1]     Objective:      Physical Exam  Constitutional:       Appearance: Normal appearance.   HENT:      Head: Normocephalic.   Cardiovascular:      Rate and Rhythm: Normal rate and regular rhythm.      Pulses: Normal pulses.      Heart sounds: Normal heart sounds.   Pulmonary:      Effort: Pulmonary effort is normal.      Breath sounds: Normal breath sounds.   Abdominal:      General: Abdomen is flat.  Bowel sounds are normal.      Palpations: Abdomen is soft.   Musculoskeletal:         General: Normal range of motion.      Cervical back: Normal range of motion and neck supple.   Skin:     General: Skin is warm and dry.   Neurological:      General: No focal deficit present.      Mental Status: She is alert and oriented to person, place, and time.   Psychiatric:         Mood and Affect: Mood normal.         Assessment:       Problem List Items Addressed This Visit    None  Visit Diagnoses         Annual physical exam    -  Primary    Relevant Orders    Comprehensive Metabolic Panel    CBC Auto Differential    Lipid Panel    Hemoglobin A1C    TSH    Rubella antibody, IgG    Rubeola antibody IgG    Mumps, IgG Screen    Hepatitis B Surface Antibody, Qual/Quant    Varicella zoster antibody, IgG    Quantiferon Gold TB    Hepatitis C antibody    Ambulatory referral/consult to Obstetrics / Gynecology            Plan:         Nicolasa was seen today for establish care.    Diagnoses and all orders for this visit:    Annual physical exam  Colon Cancer Screening:  Begin at age 45  Mammogram:  Begin at age 42  HIV:  Negative 2022  Hep C:  Ordered today  Lipids:  Ordered today  Pap Smear:  Last done in 2018.  Needs to establish with gyn.  Vaccines:  Flu shot today.  Check titers today.     Annual wellness exam completed.     All medications, histories, and concerns reviewed, reconciled, and addressed.     Appropriate Screenings per pt's sex and age have been reviewed and discussed with pt.     Orders placed for titers a quant gold.            Concepcion Rangel MD   Internal Medicine   Primary Care           [1]   Patient Active Problem List  Diagnosis    Asthma

## 2025-03-27 LAB
(RETIRING) TB2 AG MINUS NIL RESULT (OHS): 0.03
GAMMA INTERFERON BACKGROUND BLD IA-ACNC: 0.04 [IU]/ML
M TB IFN-G CD4+ BCKGRND COR BLD-ACNC: 0.02 [IU]/ML
MITOGEN IGNF BCKGRD COR BLD-ACNC: 4.62 [IU]/ML
MUMPS IGG (OHS): 23.9 AU/ML
MUMPS IGG INTERPRETATION (OHS): POSITIVE
QUANTIFERON GOLD TB INTERP: NEGATIVE
RUBEOLA (MEASLES) IGG (OHS): 200 AU/ML
RUBEOLA IGG INTERP. (OHS): POSITIVE
RUBV IGG SER-ACNC: 11.5 IU/ML
RUBV IGG SER-IMP: REACTIVE
V.ZOSTER IGG INTERP (OHS): POSITIVE
VARICELLA ZOSTER IGG (OHS): 3.66 S/CO

## 2025-04-02 ENCOUNTER — PATIENT MESSAGE (OUTPATIENT)
Dept: INTERNAL MEDICINE | Facility: CLINIC | Age: 30
End: 2025-04-02
Payer: COMMERCIAL

## 2025-04-09 ENCOUNTER — PATIENT MESSAGE (OUTPATIENT)
Dept: INTERNAL MEDICINE | Facility: CLINIC | Age: 30
End: 2025-04-09
Payer: COMMERCIAL

## 2025-04-15 ENCOUNTER — RESULTS FOLLOW-UP (OUTPATIENT)
Dept: INTERNAL MEDICINE | Facility: CLINIC | Age: 30
End: 2025-04-15

## 2025-04-15 ENCOUNTER — TELEPHONE (OUTPATIENT)
Dept: INTERNAL MEDICINE | Facility: CLINIC | Age: 30
End: 2025-04-15
Payer: COMMERCIAL

## 2025-04-15 NOTE — TELEPHONE ENCOUNTER
----- Message from Concepcion Rangel MD sent at 4/15/2025  8:20 AM CDT -----  The paper work is on her desk but please let her know she will need Hep B vaccine and she can get this at our pharmacy. Thanks   ----- Message -----  From: Lab, Background User  Sent: 3/26/2025   5:38 PM CDT  To: Concepcion Rangel MD

## 2025-04-22 ENCOUNTER — PATIENT MESSAGE (OUTPATIENT)
Dept: INTERNAL MEDICINE | Facility: CLINIC | Age: 30
End: 2025-04-22
Payer: COMMERCIAL